# Patient Record
Sex: MALE | Race: WHITE
[De-identification: names, ages, dates, MRNs, and addresses within clinical notes are randomized per-mention and may not be internally consistent; named-entity substitution may affect disease eponyms.]

---

## 2018-05-27 ENCOUNTER — HOSPITAL ENCOUNTER (INPATIENT)
Dept: HOSPITAL 31 - C.ER | Age: 33
LOS: 3 days | Discharge: HOME | DRG: 544 | End: 2018-05-30
Attending: FAMILY MEDICINE | Admitting: FAMILY MEDICINE
Payer: COMMERCIAL

## 2018-05-27 VITALS — BODY MASS INDEX: 25.8 KG/M2

## 2018-05-27 DIAGNOSIS — N28.1: ICD-10-CM

## 2018-05-27 DIAGNOSIS — N25.81: ICD-10-CM

## 2018-05-27 DIAGNOSIS — I50.33: ICD-10-CM

## 2018-05-27 DIAGNOSIS — Z99.2: ICD-10-CM

## 2018-05-27 DIAGNOSIS — I13.2: Primary | ICD-10-CM

## 2018-05-27 DIAGNOSIS — D64.9: ICD-10-CM

## 2018-05-27 DIAGNOSIS — I42.0: ICD-10-CM

## 2018-05-27 DIAGNOSIS — N17.9: ICD-10-CM

## 2018-05-27 DIAGNOSIS — N18.6: ICD-10-CM

## 2018-05-27 DIAGNOSIS — I27.20: ICD-10-CM

## 2018-05-28 VITALS — RESPIRATION RATE: 20 BRPM

## 2018-05-28 LAB
% IRON SATURATION: 22
ALBUMIN SERPL-MCNC: 3.9 G/DL
ALBUMIN SERPL-MCNC: 4.4 G/DL
ALBUMIN/GLOB SERPL: 1.4 {RATIO}
ALBUMIN/GLOB SERPL: 1.5 {RATIO}
ALT SERPL-CCNC: 40 U/L
ALT SERPL-CCNC: 46 U/L
APTT BLD: 31 SECONDS
AST SERPL-CCNC: 16 U/L
AST SERPL-CCNC: 26 U/L
BASOPHILS # BLD AUTO: 0 K/UL
BASOPHILS # BLD AUTO: 0 K/UL
BASOPHILS NFR BLD: 0.4 %
BASOPHILS NFR BLD: 0.5 %
BUN SERPL-MCNC: 60 MG/DL
BUN SERPL-MCNC: 62 MG/DL
CALCIUM SERPL-MCNC: 9.2 MG/DL
CALCIUM SERPL-MCNC: 9.7 MG/DL
EOSINOPHIL # BLD AUTO: 0.1 K/UL
EOSINOPHIL # BLD AUTO: 0.2 K/UL
EOSINOPHIL NFR BLD: 2.4 %
EOSINOPHIL NFR BLD: 2.7 %
ERYTHROCYTE [DISTWIDTH] IN BLOOD BY AUTOMATED COUNT: 14.7 %
ERYTHROCYTE [DISTWIDTH] IN BLOOD BY AUTOMATED COUNT: 14.8 %
FERRITIN SERPL-MCNC: 747 NG/ML
GFR NON-AFRICAN AMERICAN: 5
GFR NON-AFRICAN AMERICAN: 5
HDLC SERPL-MCNC: 30 MG/DL
HGB BLD-MCNC: 8 G/DL
HGB BLD-MCNC: 8.7 G/DL
INR PPP: 1
IRON SERPL-MCNC: 40 UG/DL
LDLC SERPL-MCNC: 67 MG/DL
LIPASE: 179 U/L
LYMPHOCYTES # BLD AUTO: 1.3 K/UL
LYMPHOCYTES # BLD AUTO: 1.5 K/UL
LYMPHOCYTES NFR BLD AUTO: 20.8 %
LYMPHOCYTES NFR BLD AUTO: 21 %
MCH RBC QN AUTO: 31.7 PG
MCH RBC QN AUTO: 32 PG
MCHC RBC AUTO-ENTMCNC: 34.2 G/DL
MCHC RBC AUTO-ENTMCNC: 34.4 G/DL
MCV RBC AUTO: 92.6 FL
MCV RBC AUTO: 92.9 FL
MONOCYTES # BLD: 0.5 K/UL
MONOCYTES # BLD: 0.6 K/UL
MONOCYTES NFR BLD: 7.9 %
MONOCYTES NFR BLD: 8.7 %
NEUTROPHILS # BLD: 4.9 K/UL
NEUTROPHILS NFR BLD AUTO: 67.3 %
NRBC BLD AUTO-RTO: 0.1 %
PLATELET # BLD: 140 K/UL
PLATELET # BLD: 151 K/UL
PMV BLD AUTO: 8.3 FL
PMV BLD AUTO: 8.6 FL
PROTHROMBIN TIME: 10.9 SECONDS
RBC # BLD AUTO: 2.54 MIL/UL
RBC # BLD AUTO: 2.72 MIL/UL
TIBC SERPL-MCNC: 184 UG/DL
TROPONIN I SERPL-MCNC: 0.04 NG/ML
WBC # BLD AUTO: 6.1 K/UL
WBC # BLD AUTO: 7.3 K/UL

## 2018-05-28 PROCEDURE — 5A1D70Z PERFORMANCE OF URINARY FILTRATION, INTERMITTENT, LESS THAN 6 HOURS PER DAY: ICD-10-PCS

## 2018-05-28 RX ADMIN — ERYTHROPOIETIN SCH UNIT: 10000 INJECTION, SOLUTION INTRAVENOUS; SUBCUTANEOUS at 16:30

## 2018-05-28 RX ADMIN — Medication SCH: at 10:00

## 2018-05-28 NOTE — CP.PCM.CON
History of Present Illness





- History of Present Illness


History of Present Illness: 





This is a 34 yo male, originally from [place], with past medical hx of CHF 

with reduced ejection fraction, cardiomyopathy, pulmonary hypertension, renal 

cysts, and ESRD, presentedwith chief complaint of SOB. He also reports non 

productive cough. He is chronically short of breath and it was worsening today.

  Wife could not say the reason. Denies fevers, chills, cp, palpitations. He 

has been on dialysis for 2 yrs. He gets dialysis through left av fistula. He 

does not miss sessions, anuric.  He is compliant with all medications. He was 

given IV lasix in ER to help with vasodilation. 








PMH: CHF, dilated cardiomyopathy, pulmonary hypertension, renal cysts, ESRD on 

dialysis MWF 





PSH: av fistula left upper ext, vein mapping





Allergies: NKDA





FH: father- living- no medical problems; mother- living- HTN





Home meds: sensipar, lisinopril?, cardizem?, could not say for sure his home 

medications; needs med rec.





Social hx: Denies smoking. Denies secondhand smoke. Denies drinking. Denies 

drug use. Born in [place]. Does not work. Fully mobile, able to do all ADLs. 





Review of Systems





- Review of Systems


All systems: reviewed and no additional remarkable complaints except (as per HPI

)





Past Patient History





- Infectious Disease


Hx of Infectious Diseases: None





- Tetanus Immunizations


Tetanus Immunization: Unknown





- Past Medical History & Family History


Past Medical History?: Yes





- Past Social History


Smoking Status: Never Smoked


Chewing Tobacco Use: No


Cigar Use: No


Alcohol: None


Drugs: Denies


Home Situation {Lives}: With Family


Domestic Violence: Negative





- CARDIAC


Hx Cardiac Disorders: No





- PULMONARY


Other/Comment: SOB A COUPLE DAYS AGO





- NEUROLOGICAL


Hx Neurological Disorder: No





- HEENT


Other/Comment: L EYE BLURRY





- RENAL


Hx Chronic Kidney Disease: No





- ENDOCRINE/METABOLIC


Hx Endocrine Disorders: No





- HEMATOLOGICAL/ONCOLOGICAL


Hx Blood Disorders: No





- INTEGUMENTARY


Hx Dermatological Problems: No





- MUSCULOSKELETAL/RHEUMATOLOGICAL


Hx Musculoskeletal Disorders: No


Hx Falls: No





- GASTROINTESTINAL


Hx Gastrointestinal Disorders: No





- PSYCHIATRIC


Hx Substance Use: No





- SURGICAL HISTORY


Hx Surgeries: No





- ANESTHESIA


Hx Anesthesia: No





Meds


Allergies/Adverse Reactions: 


 Allergies











Allergy/AdvReac Type Severity Reaction Status Date / Time


 


No Known Allergies Allergy   Verified 02/11/16 01:36














- Medications


Medications: 


 Current Medications





Aspirin (Aspirin Chewable)  81 mg PO DAILY [facility]


Calcitriol (Rocaltrol)  0.25 mcg PO DAILY [facility]


Cinacalcet (Sensipar)  30 mg PO DAILY [facility]


Epoetin Jonathan (Procrit)  10,000 unit IV MWF [facility]


Lisinopril (Zestril)  2.5 mg PO DAILY [facility]


Rosuvastatin Calcium (Crestor)  10 mg PO HS [facility]


Vitamin B Complex/Vit C/Folic Acid (Nephro-Gisselle)  1 tab PO DAILY [facility]











Physical Exam





- Constitutional


Appears: Non-toxic, No Acute Distress, Chronically Ill





- Head Exam


Head Exam: NORMAL INSPECTION, NORMOCEPHALIC





- Eye Exam


Eye Exam: Normal appearance, PERRL





- ENT Exam


ENT Exam: Mucous Membranes Moist, Normal Exam





- Neck Exam


Neck exam: Positive for: Full Rom, Normal Inspection





- Respiratory Exam


Respiratory Exam: Decreased Breath Sounds, Rales (b/l), NORMAL BREATHING PATTERN





- Cardiovascular Exam


Cardiovascular Exam: REGULAR RHYTHM, RRR


Additional comments: 





lue avf





- GI/Abdominal Exam


GI & Abdominal Exam: Distended, Normal Bowel Sounds, Soft





- Extremities Exam


Extremities exam: Positive for: normal inspection (lue avf), pedal edema





- Back Exam


Back exam: NORMAL INSPECTION





- Psychiatric Exam


Psychiatric exam: Normal Affect, Normal Mood





- Skin


Skin Exam: Dry, Intact





Results





- Vital Signs


Recent Vital Signs: 


 Last Vital Signs











Temp  98.3 F   05/28/18 08:00


 


Pulse  88   05/28/18 08:00


 


Resp  20   05/28/18 08:00


 


BP  160/98 H  05/28/18 08:00


 


Pulse Ox  94 L  05/28/18 08:00














- Labs


Result Diagrams: 


 05/28/18 05:00





 05/28/18 06:51


Labs: 


 Laboratory Results - last 24 hr











  05/28/18 05/28/18 05/28/18





  00:41 00:41 00:41


 


WBC  7.3  D  


 


RBC  2.72 L  


 


Hgb  8.7 L D  


 


Hct  25.3 L  


 


MCV  92.9  


 


MCH  32.0 H  


 


MCHC  34.4  


 


RDW  14.7 H  


 


Plt Count  151  


 


MPV  8.6  


 


Neut % (Auto)  67.3  


 


Lymph % (Auto)  20.8  


 


Mono % (Auto)  8.7  


 


Eos % (Auto)  2.7  


 


Baso % (Auto)  0.5  


 


Neut # (Auto)  4.9  


 


Lymph # (Auto)  1.5  


 


Mono # (Auto)  0.6  


 


Eos # (Auto)  0.2  


 


Baso # (Auto)  0.0  


 


PT   10.9 


 


INR   1.0 


 


APTT   31 


 


Sodium    146


 


Potassium    4.8


 


Chloride    103


 


Carbon Dioxide    25


 


Anion Gap    23 H


 


BUN    62 H


 


Creatinine    12.0 H*


 


Est GFR ( Amer)    6


 


Est GFR (Non-Af Amer)    5


 


Random Glucose    90


 


Hemoglobin A1c   


 


Calcium    9.7


 


Ferritin   


 


Total Bilirubin    0.6


 


AST    26


 


ALT    46


 


Alkaline Phosphatase    44


 


Troponin I    0.0390


 


NT-Pro-B Natriuret Pep    05961 H


 


Total Protein    7.3


 


Albumin    4.4


 


Globulin    2.9


 


Albumin/Globulin Ratio    1.5


 


Triglycerides   


 


Cholesterol   


 


LDL Cholesterol Direct   


 


HDL Cholesterol   


 


Lipase    179


 


Free T4   


 


TSH 3rd Generation   














  05/28/18 05/28/18 05/28/18





  04:02 04:36 05:00


 


WBC    6.1


 


RBC    2.54 L


 


Hgb    8.0 L


 


Hct    23.5 L


 


MCV    92.6


 


MCH    31.7 H


 


MCHC    34.2


 


RDW    14.8 H


 


Plt Count    140


 


MPV    8.3


 


Neut % (Auto)   


 


Lymph % (Auto)    21.0


 


Mono % (Auto)    7.9


 


Eos % (Auto)    2.4


 


Baso % (Auto)    0.4


 


Neut # (Auto)   


 


Lymph # (Auto)    1.3


 


Mono # (Auto)    0.5


 


Eos # (Auto)    0.1


 


Baso # (Auto)    0.0


 


PT   


 


INR   


 


APTT   


 


Sodium   


 


Potassium   


 


Chloride   


 


Carbon Dioxide   


 


Anion Gap   


 


BUN   


 


Creatinine   


 


Est GFR ( Amer)   


 


Est GFR (Non-Af Amer)   


 


Random Glucose   


 


Hemoglobin A1c   


 


Calcium   


 


Ferritin   747.0 


 


Total Bilirubin   


 


AST   


 


ALT   


 


Alkaline Phosphatase   


 


Troponin I   


 


NT-Pro-B Natriuret Pep   


 


Total Protein   


 


Albumin   


 


Globulin   


 


Albumin/Globulin Ratio   


 


Triglycerides   101  D 


 


Cholesterol   143 


 


LDL Cholesterol Direct   67 


 


HDL Cholesterol   30 


 


Lipase   


 


Free T4  1.11  


 


TSH 3rd Generation   0.63 














  05/28/18 05/28/18





  06:51 07:04


 


WBC  


 


RBC  


 


Hgb  


 


Hct  


 


MCV  


 


MCH  


 


MCHC  


 


RDW  


 


Plt Count  


 


MPV  


 


Neut % (Auto)  


 


Lymph % (Auto)  


 


Mono % (Auto)  


 


Eos % (Auto)  


 


Baso % (Auto)  


 


Neut # (Auto)  


 


Lymph # (Auto)  


 


Mono # (Auto)  


 


Eos # (Auto)  


 


Baso # (Auto)  


 


PT  


 


INR  


 


APTT  


 


Sodium  144 


 


Potassium  5.0 


 


Chloride  105 


 


Carbon Dioxide  23 


 


Anion Gap  21 H 


 


BUN  60 H 


 


Creatinine  12.8 H* 


 


Est GFR ( Amer)  5 


 


Est GFR (Non-Af Amer)  5 


 


Random Glucose  89 


 


Hemoglobin A1c   4.9


 


Calcium  9.2 


 


Ferritin  


 


Total Bilirubin  0.6 


 


AST  16 L D 


 


ALT  40 


 


Alkaline Phosphatase  39 


 


Troponin I  


 


NT-Pro-B Natriuret Pep  


 


Total Protein  6.6 


 


Albumin  3.9 


 


Globulin  2.7 


 


Albumin/Globulin Ratio  1.4 


 


Triglycerides  


 


Cholesterol  


 


LDL Cholesterol Direct  


 


HDL Cholesterol  


 


Lipase  


 


Free T4  


 


TSH 3rd Generation  














Assessment & Plan


(1) CHF (congestive heart failure)


Status: Acute   





(2) ESRD needing dialysis


Status: Acute   





(3) Respiratory distress


Status: Acute   





(4) ESRD (end stage renal disease)


Status: Acute   





(5) Hypertension


Status: Acute   





(6) Secondary hyperparathyroidism


Status: Acute   





(7) Severe anemia


Status: Acute   





(8) CHF (congestive heart failure)


Status: Suspected   





- Assessment and Plan (Free Text)


Assessment: 





cmp/ chf/fluid overload


htn


anemia


esrd


pulm htn





plan:


hd today, aggressive fluid removal


fluid rrestriction 1L/day advised


increase lisinopril dose to 5mg daily


sindi w/ hd

## 2018-05-28 NOTE — C.PDOC
History Of Present Illness


33 year old male presents to the ED c/o SOB associated with coughing. Patient 

reports he goes to dialysis on Monday, Wednesday, Friday. Patient speaking in 

complete sentences. Patient denies fever, chills, nausea, vomit, weakness, 

numbness. 


Time Seen by Provider: 05/28/18 00:47


Chief Complaint (Nursing): Shortness Of Breath


History Per: Patient


History/Exam Limitations: no limitations


Onset/Duration Of Symptoms: Hrs


Current Symptoms Are (Timing): Still Present


Initiating Event: Other


Quality: "Pain"


Exacerbating Factor(s): Coughing


Current Respiratory Medications: See Home Med List


Associated Symptoms: Other (cough)


Recent travel outside of the [place]: No


Additional History Per: Patient





Past Medical History


Reviewed: Historical Data, Nursing Documentation, Vital Signs


Vital Signs: 


 Last Vital Signs











Temp  98.7 F   05/28/18 00:00


 


Pulse  84   05/28/18 01:49


 


Resp  29 H  05/28/18 01:49


 


BP  169/103 H  05/28/18 01:49


 


Pulse Ox  100   05/28/18 03:08














- Medical History


PMH: End Stage Renal Disease


Surgical History: No Surg Hx





- [facility] Procedures








BYPASS LEFT RADIAL ARTERY TO LOWER ARM VEIN, OPEN APPROACH (02/11/16)


INSERTION OF INFUSION DEV INTO SUP VENA CAVA, PERC APPROACH (02/11/16)


MEASURE CARDIAC SAMPL & PRESSURE, BILATERAL, PERC (02/11/16)


PERFORMANCE OF URINARY FILTRATION, MULTIPLE (02/11/16)


PLAIN RADIOGRAPHY OF R & L HEART USING L OSM CONTRAST (02/11/16)








Family History: States: Unknown Family Hx





- Social History


Hx Alcohol Use: No


Hx Substance Use: No





- Immunization History


Hx Tetanus Toxoid Vaccination: No


Hx Influenza Vaccination: No


Hx Pneumococcal Vaccination: No





Review Of Systems


Constitutional: Negative for: Fever, Chills


Cardiovascular: Negative for: Chest Pain


Respiratory: Positive for: Cough, Shortness of Breath


Gastrointestinal: Negative for: Nausea, Vomiting


Skin: Negative for: Rash


Neurological: Negative for: Weakness, Numbness





Physical Exam





- Physical Exam


Appears: Non-toxic, No Acute Distress


Skin: Warm, Dry


Head: Normacephalic


Eye(s): bilateral: Normal Inspection


Oral Mucosa: Moist


Neck: Supple


Chest: Symmetrical


Cardiovascular: Rhythm Regular


Respiratory: Rales (diffuse), No Rhonchi, No Wheezing


Gastrointestinal/Abdominal: Soft, No Tenderness, No Guarding, No Rebound


Extremity: Normal ROM, No Tenderness, Capillary Refill (< 2 seconds), No 

Swelling, Other (left forearm hemodialysis graft with good thrill and bruit)


Extremity: Bilateral: Atraumatic


Pulses: Left Radial: Normal, Right Radial: Normal


Neurological/Psych: Oriented x3, Normal Speech


Gait: Steady





ED Course And Treatment





- Laboratory Results


Result Diagrams: 


 05/28/18 00:41





 05/28/18 00:41


ECG: Interpreted By Me, Viewed By Me


O2 Sat by Pulse Oximetry: 100


Pulse Ox Interpretation: Normal





- Radiology


CXR: Interpreted by Me, Viewed By Me


CXR Interpretation: Yes: Other (acute pulm edema).  No: Infiltrates, Fracture, 

Cardiomegaly


Progress Note: 3:10 am  spoke with dr dahl ( renal covering for dr atkins) 

- ok to dialyse in am





Critical Care Time





- Critical Care Note


Total Time (in mins): 30


Documented critical care: time excludes all time spent performing seperately 

billable procedures.





Disposition


Discussed With DrSonny: Alexei Dumont


Comment: accepted the pt onhis service and took over the care at 3:10 AM


Doctor Will See Patient In The: ED


Counseled Patient/Family Regarding: Studies Performed, Diagnosis





- Disposition


Disposition: HOSPITALIZED


Disposition Time: 00:47


Condition: FAIR


Forms:  [facility] (English)





- POA


Present On Arrival: None





- Clinical Impression


Clinical Impression: 


 Dyspnea, Respiratory distress, CHF (congestive heart failure), ESRD needing 

dialysis








- Scribe Statement


The provider has reviewed the documentation as recorded by the Scribe


Antony Castillo





All medical record entries made by the Scribe were at my direction and 

personally dictated by me. I have reviewed the chart and agree that the record 

accurately reflects my personal performance of the history, physical exam, 

medical decision making, and the department course for this patient. I have 

also personally directed, reviewed, and agree with the discharge instructions 

and disposition.





Decision To Admit





- Pt Status Changed To:


Hospital Disposition Of: Inpatient





- Admit Certification


Admit to Inpatient:: After my assessment, the patient will require 

hospitalization for at least two midnights.  This is because of the severity of 

symptoms shown, intensity of services needed, and/or the medical risk in this 

patient being treated as an outpatient.





- InPatient:


Physician Admission Certification: I certify that this patient requires 2 or 

more midnights of care for the following reason:: After my assessment, the 

patient will require hospitalization for at least two midnights.  This is 

because of the severity of symptoms shown, intensity of services needed, and/or 

the medical risk in this patient being treated as an outpatient.





- .


Bed Request Type: Telemetry


Admitting Physician: Alexei Dumont


Patient Diagnosis: 


 Dyspnea, Respiratory distress, CHF (congestive heart failure), ESRD needing 

dialysis

## 2018-05-28 NOTE — CP.PCM.HP
<Rik Myles - Last Filed: 05/28/18 05:01>





History of Present Illness





- History of Present Illness


History of Present Illness: 














This is a 32 yo male, originally from [place], with past medical hx of CHF 

with reduced ejection fraction, cardiomyopathy, pulmonary hypertension, renal 

cysts, and ESRD, presenting today to [facility] ER with chief complaint of SOB. Pt 

is accompanied by wife. Wife says he drove pt into ER. Pt has been having sob 

that started on 7 pm this past evening. pt was attempting to go to sleep and 

could not. He also reports non productive cough. He is chronically short of 

breath and it was worsening today. Pt is end stage renal. Wife could not say 

the reason. Denies fevers, chills, cp, palpitations. He has been on dialysis 

for 2 yrs. He gets dialysis through left av fistula. He does not miss sessions. 

He sees Dr. Regan. He cannot say how much fluid was taken off last visit. He 

does not produce urine. He is compliant with all medications. He was given IV 

lasix in ER to help with vasodilation. 








PMD: Lane/TING in [facility] clinic





Specialists: Jass- nephrology





Insurance: [employer] 100 percent [facility]





PMH: CHF, dilated cardiomyopathy, pulmonary hypertension, renal cysts, ESRD on 

dialysis MWF 





PSH: av fistula left upper ext, vein mapping





Allergies: NKDA





FH: father- living- no medical problems; mother- living- HTN





Home meds: sensipar, lisinopril?, cardizem?, could not say for sure his home 

medications; needs med rec.





Social hx: Denies smoking. Denies secondhand smoke. Denies drinking. Denies 

drug use. Born in [place]. Does not work. Fully mobile, able to do all ADLs. 








Recent ER visits/hospitalizations: pt has not been to ER in our system in over 

a year.





Present on Admission





- Present on Admission


Any Indicators Present on Admission: No


History of Uncontrolled Diabetes: No


Urinary Catheter: No


Decubitus Ulcer Present: No





Review of Systems





- Constitutional


Constitutional: absent: Chills, Fever





- EENT


Eyes: absent: Blurred Vision, Change in Vision


Nose/Mouth/Throat: absent: Sore Throat, Neck Pain





- Cardiovascular


Cardiovascular: Dyspnea.  absent: Chest Pain, Chest Pain at Rest





- Respiratory


Respiratory: Cough, Dyspnea, Dyspnea on Exertion.  absent: Hemoptysis





- Gastrointestinal


Gastrointestinal: absent: Abdominal Pain, Nausea, Vomiting





- Genitourinary


Genitourinary: absent: Change in Urinary Stream, Difficulty Urinating





- Musculoskeletal


Musculoskeletal: absent: Back Pain, Neck Pain, Numbness, Tingling





- Integumentary


Integumentary: absent: Bleeding Lesions, Changing Lesions





- Neurological


Neurological: absent: Numbness, Focal Weakness, Tingling, Weakness





- Psychiatric


Psychiatric: absent: Anxiety, Hallucinations, Visual Hallucinations





- Hematologic/Lymphatic


Hematologic: absent: Easy Bleeding, Easy Bruising





Past Patient History





- Infectious Disease


Hx of Infectious Diseases: None





- Tetanus Immunizations


Tetanus Immunization: Unknown





- Past Medical History & Family History


Past Medical History?: Yes


Pertinent Family History: 





HTN , heart disease in family 





- Past Social History


Smoking Status: Never Smoked


Chewing Tobacco Use: No


Cigar Use: No


Alcohol: None


Drugs: Denies


Home Situation {Lives}: With Family


Domestic Violence: Negative





- CARDIAC


Hx Cardiac Disorders: No





- PULMONARY


Other/Comment: SOB A COUPLE DAYS AGO





- NEUROLOGICAL


Hx Neurological Disorder: No





- HEENT


Other/Comment: L EYE BLURRY





- RENAL


Hx Chronic Kidney Disease: No





- ENDOCRINE/METABOLIC


Hx Endocrine Disorders: No





- HEMATOLOGICAL/ONCOLOGICAL


Hx Blood Disorders: No





- INTEGUMENTARY


Hx Dermatological Problems: No





- MUSCULOSKELETAL/RHEUMATOLOGICAL


Hx Musculoskeletal Disorders: No


Hx Falls: No





- GASTROINTESTINAL


Hx Gastrointestinal Disorders: No





- PSYCHIATRIC


Hx Substance Use: No





- SURGICAL HISTORY


Hx Surgeries: No





- ANESTHESIA


Hx Anesthesia: No





Meds


Allergies/Adverse Reactions: 


 Allergies











Allergy/AdvReac Type Severity Reaction Status Date / Time


 


No Known Allergies Allergy   Verified 02/11/16 01:36














Physical Exam





- Constitutional


Appears: No Acute Distress, Chronically Ill





- Head Exam


Head Exam: ATRAUMATIC, NORMAL INSPECTION





- Eye Exam


Eye Exam: EOMI





- ENT Exam


ENT Exam: Mucous Membranes Moist





- Neck Exam


Neck exam: Positive for: Normal Inspection





- Respiratory Exam


Respiratory Exam: Rales.  absent: Respiratory Distress, NORMAL BREATHING PATTERN





- Cardiovascular Exam


Cardiovascular Exam: +S1, +S2.  absent: Tachycardia





- GI/Abdominal Exam


GI & Abdominal Exam: Normal Bowel Sounds, Soft.  absent: Tenderness





- Extremities Exam


Extremities exam: Positive for: full ROM, normal inspection





- Back Exam


Back exam: NORMAL INSPECTION





- Neurological Exam


Neurological exam: Alert, CN II-XII Intact, Oriented x3





- Psychiatric Exam


Psychiatric exam: Normal Affect, Normal Mood





- Skin


Skin Exam: Dry, Intact, Normal Color, Warm





Results





- Vital Signs


Recent Vital Signs: 





 Last Vital Signs











Temp  98.7 F   05/28/18 00:00


 


Pulse  85   05/28/18 03:12


 


Resp  20   05/28/18 03:12


 


BP  158/98 H  05/28/18 03:12


 


Pulse Ox  100   05/28/18 03:21














- Labs


Result Diagrams: 


 05/28/18 00:41





 05/28/18 00:41


Labs: 





 Laboratory Results - last 24 hr











  05/28/18 05/28/18 05/28/18





  00:41 00:41 00:41


 


WBC  7.3  D  


 


RBC  2.72 L  


 


Hgb  8.7 L D  


 


Hct  25.3 L  


 


MCV  92.9  


 


MCH  32.0 H  


 


MCHC  34.4  


 


RDW  14.7 H  


 


Plt Count  151  


 


MPV  8.6  


 


Neut % (Auto)  67.3  


 


Lymph % (Auto)  20.8  


 


Mono % (Auto)  8.7  


 


Eos % (Auto)  2.7  


 


Baso % (Auto)  0.5  


 


Neut # (Auto)  4.9  


 


Lymph # (Auto)  1.5  


 


Mono # (Auto)  0.6  


 


Eos # (Auto)  0.2  


 


Baso # (Auto)  0.0  


 


PT   10.9 


 


INR   1.0 


 


APTT   31 


 


Sodium    146


 


Potassium    4.8


 


Chloride    103


 


Carbon Dioxide    25


 


Anion Gap    23 H


 


BUN    62 H


 


Creatinine    12.0 H*


 


Est GFR ( Amer)    6


 


Est GFR (Non-Af Amer)    5


 


Random Glucose    90


 


Calcium    9.7


 


Total Bilirubin    0.6


 


AST    26


 


ALT    46


 


Alkaline Phosphatase    44


 


Troponin I    0.0390


 


NT-Pro-B Natriuret Pep    16892 H


 


Total Protein    7.3


 


Albumin    4.4


 


Globulin    2.9


 


Albumin/Globulin Ratio    1.5


 


Lipase    179














Assessment & Plan





- Assessment and Plan (Free Text)


Assessment: 














This is a 32 yo male, originally from [place], with past medical hx of CHF and 

ESRD, presenting with











1. Shortness of breath





-ef 45-50 percent


-echo 2018 shows mildly impaired LV fx, moderate pulm hypertension


-cardio consult. recs appreciated.


-ekg does show some changes from previous


-troponin not indicated


-no chest pain currently


-bipap if needed


-CXR -shows pulmonary edema 


-asa 81 mg po daily


-crestor 10 mg po hs


-HGB a1C pending; previously 4.7


-TSH and free t4 pending


-strict I/O


-daily weight 


-pt condition: fair 








2. hx of ESRD





-nephrology consult. recs appreciated. Dr. Regan


-dialysis in am


-continue nephrovite


-continue calcitriol .25 mcg po daily


-continue sensipar


-bleeding precautions


-BNP over 77,000 likely secondary to renal failure 











3. anemia





-normocytic


-ldh, haptoglobin


-stool occult blood


-iron studies 











3. GI/DVT ppx








-scds


-protonix 40 mg po daily


-renal diet











discussed with Dr. Dumont





<Alexei Dumont - Last Filed: 05/28/18 06:25>





Results





- Vital Signs


Recent Vital Signs: 





 Last Vital Signs











Temp  98.7 F   05/28/18 00:00


 


Pulse  96 H  05/28/18 05:59


 


Resp  22   05/28/18 05:59


 


BP  156/103 H  05/28/18 05:59


 


Pulse Ox  95   05/28/18 05:59














- Labs


Result Diagrams: 


 05/28/18 05:00





 05/28/18 00:41


Labs: 





 Laboratory Results - last 24 hr











  05/28/18 05/28/18 05/28/18





  00:41 00:41 00:41


 


WBC  7.3  D  


 


RBC  2.72 L  


 


Hgb  8.7 L D  


 


Hct  25.3 L  


 


MCV  92.9  


 


MCH  32.0 H  


 


MCHC  34.4  


 


RDW  14.7 H  


 


Plt Count  151  


 


MPV  8.6  


 


Neut % (Auto)  67.3  


 


Lymph % (Auto)  20.8  


 


Mono % (Auto)  8.7  


 


Eos % (Auto)  2.7  


 


Baso % (Auto)  0.5  


 


Neut # (Auto)  4.9  


 


Lymph # (Auto)  1.5  


 


Mono # (Auto)  0.6  


 


Eos # (Auto)  0.2  


 


Baso # (Auto)  0.0  


 


PT   10.9 


 


INR   1.0 


 


APTT   31 


 


Sodium    146


 


Potassium    4.8


 


Chloride    103


 


Carbon Dioxide    25


 


Anion Gap    23 H


 


BUN    62 H


 


Creatinine    12.0 H*


 


Est GFR ( Amer)    6


 


Est GFR (Non-Af Amer)    5


 


Random Glucose    90


 


Calcium    9.7


 


Ferritin   


 


Total Bilirubin    0.6


 


AST    26


 


ALT    46


 


Alkaline Phosphatase    44


 


Troponin I    0.0390


 


NT-Pro-B Natriuret Pep    70156 H


 


Total Protein    7.3


 


Albumin    4.4


 


Globulin    2.9


 


Albumin/Globulin Ratio    1.5


 


Triglycerides   


 


Cholesterol   


 


LDL Cholesterol Direct   


 


HDL Cholesterol   


 


Lipase    179


 


TSH 3rd Generation   














  05/28/18 05/28/18





  04:36 05:00


 


WBC   6.1


 


RBC   2.54 L


 


Hgb   8.0 L


 


Hct   23.5 L


 


MCV   92.6


 


MCH   31.7 H


 


MCHC   34.2


 


RDW   14.8 H


 


Plt Count   140


 


MPV   8.3


 


Neut % (Auto)  


 


Lymph % (Auto)   21.0


 


Mono % (Auto)   7.9


 


Eos % (Auto)   2.4


 


Baso % (Auto)   0.4


 


Neut # (Auto)  


 


Lymph # (Auto)   1.3


 


Mono # (Auto)   0.5


 


Eos # (Auto)   0.1


 


Baso # (Auto)   0.0


 


PT  


 


INR  


 


APTT  


 


Sodium  


 


Potassium  


 


Chloride  


 


Carbon Dioxide  


 


Anion Gap  


 


BUN  


 


Creatinine  


 


Est GFR ( Amer)  


 


Est GFR (Non-Af Amer)  


 


Random Glucose  


 


Calcium  


 


Ferritin  747.0 


 


Total Bilirubin  


 


AST  


 


ALT  


 


Alkaline Phosphatase  


 


Troponin I  


 


NT-Pro-B Natriuret Pep  


 


Total Protein  


 


Albumin  


 


Globulin  


 


Albumin/Globulin Ratio  


 


Triglycerides  101  D 


 


Cholesterol  143 


 


LDL Cholesterol Direct  67 


 


HDL Cholesterol  30 


 


Lipase  


 


TSH 3rd Generation  0.63 














Assessment & Plan





- Date & Time


Date: 05/28/18 (I have seen and examined the patient.  I agree with the 

findings and plan of care as documented by Dr. Myles.  Patient with CHF 

exacerbation.  Lasix IV given in ED.  Consult to Cardio.  BIPAP as necessary.  

Also with ESRD.  Consult to Nephro.  Dialysis this AM.  Monitor for acute 

changes.)


Time: 06:24





Attending/Attestation





- Attestation


I have personally seen and examined this patient.: Yes


I have fully participated in the care of the patient.: Yes


I have reviewed all pertinent clinical information: Yes

## 2018-05-28 NOTE — RAD
HISTORY:

sob  



COMPARISON:

Portable chest 02/12/2016. 



FINDINGS:



LUNGS:

Patchy airspace disease in the bilateral bases underlying CHF is not 

excluded. Interstitial my markings are increased at the mid to 

inferior lung zones bilaterally.



PLEURA:

No significant pleural effusion identified, no pneumothorax apparent.



CARDIOVASCULAR:

Cardiomegaly is reiterated with right center venous dialysis catheter 

removed. Cephalization is prominent indicating CHF.



OSSEOUS STRUCTURES:

No significant abnormalities.



VISUALIZED UPPER ABDOMEN:

Normal.



OTHER FINDINGS:

None.



IMPRESSION:

Prominent CHF. Underlying bilateral basilar airspace disease not 

excluded. Clinically correlate further.

## 2018-05-28 NOTE — CP.PCM.CON
History of Present Illness





- History of Present Illness


History of Present Illness: 





33 year old with hx of dilated cardiomyopathy, 2016 had cath with normal 

coronaries and low EF 20%, with PAH. also with acute renal failure. started HD 

and medical treatment, improved EF to 60% on Mugga, no ICD, follow at the 

clinic at , 5/18 echo with EF 45-50%. still with severe pulmonary HTN, on HD. 

now with chronic SOB. ? not on B Blocker, needs to stop digoxin, start 

metoprolol, continue lisinopril and HD, no need for ICD, + fluid restriction, 

strict observation of daily wt, minimal EKG changes, dig effect v/s 

cardiomyopathy.





Review of Systems





- Review of Systems


Systems not reviewed;Unavailable: Respiratory Distress





- Constitutional


Constitutional: Anorexia, Weakness





- EENT


Eyes: absent: Discharge


Ears: absent: Ear Discharge, Dizziness


Nose/Mouth/Throat: absent: Epistaxis, Bleeding Gums





- Cardiovascular


Cardiovascular: absent: Acrocyanosis, Chest Pain, Diaphoresis, Leg Edema, 

Palpitations, Syncope





- Respiratory


Respiratory: Cough, Dyspnea.  absent: Hemoptysis





- Gastrointestinal


Gastrointestinal: absent: Abdominal Pain, Diarrhea, Hematochezia, Vomiting





- Genitourinary


Genitourinary: absent: Change in Urinary Stream





Past Patient History





- Infectious Disease


Hx of Infectious Diseases: None





- Tetanus Immunizations


Tetanus Immunization: Unknown





- Past Medical History & Family History


Past Medical History?: Yes





- Past Social History


Smoking Status: Never Smoked


Chewing Tobacco Use: No


Cigar Use: No


Alcohol: None


Drugs: Denies


Home Situation {Lives}: With Family


Domestic Violence: Negative





- CARDIAC


Hx Cardiac Disorders: No





- PULMONARY


Other/Comment: SOB A COUPLE DAYS AGO





- NEUROLOGICAL


Hx Neurological Disorder: No





- HEENT


Other/Comment: L EYE BLURRY





- RENAL


Hx Chronic Kidney Disease: No





- ENDOCRINE/METABOLIC


Hx Endocrine Disorders: No





- HEMATOLOGICAL/ONCOLOGICAL


Hx Blood Disorders: No





- INTEGUMENTARY


Hx Dermatological Problems: No





- MUSCULOSKELETAL/RHEUMATOLOGICAL


Hx Musculoskeletal Disorders: No


Hx Falls: No





- GASTROINTESTINAL


Hx Gastrointestinal Disorders: No





- PSYCHIATRIC


Hx Substance Use: No





- SURGICAL HISTORY


Hx Surgeries: No





- ANESTHESIA


Hx Anesthesia: No





Meds


Allergies/Adverse Reactions: 


 Allergies











Allergy/AdvReac Type Severity Reaction Status Date / Time


 


No Known Allergies Allergy   Verified 02/11/16 01:36














- Medications


Medications: 


 Current Medications





Aspirin (Aspirin Chewable)  81 mg PO DAILY DEON


Calcitriol (Rocaltrol)  0.25 mcg PO DAILY Pending sale to [facility]


Cinacalcet (Sensipar)  30 mg PO DAILY Pending sale to [facility]


Digoxin (Digoxin)  0.125 mg PO DAILY@1800 DEON


Epoetin Jonathan (Procrit)  10,000 unit IV MWF Pending sale to [facility]


Lisinopril (Zestril)  2.5 mg PO DAILY Pending sale to [facility]


Rosuvastatin Calcium (Crestor)  10 mg PO HS Pending sale to [facility]


Vitamin B Complex/Vit C/Folic Acid (Nephro-Gisselle)  1 tab PO DAILY DEON











Physical Exam





- Constitutional


Appears: Non-toxic





- Head Exam


Head Exam: ATRAUMATIC





- Eye Exam


Eye Exam: EOMI





- ENT Exam


ENT Exam: Mucous Membranes Moist





- Neck Exam


Neck exam: Negative for: Lymphadenopathy, Thyromegaly





- Respiratory Exam


Respiratory Exam: Clear to Auscultation Bilateral.  absent: Rales





- Cardiovascular Exam


Cardiovascular Exam: REGULAR RHYTHM, Systolic Murmur





- GI/Abdominal Exam


GI & Abdominal Exam: Normal Bowel Sounds.  absent: Organomegaly





- Rectal Exam


Rectal Exam: Deferred





- Extremities Exam


Extremities exam: Positive for: normal capillary refill.  Negative for: calf 

tenderness





- Neurological Exam


Neurological exam: Alert, Oriented x3





- Psychiatric Exam


Psychiatric exam: Normal Mood





- Skin


Skin Exam: Dry





Results





- Vital Signs


Recent Vital Signs: 


 Last Vital Signs











Temp  98.3 F   05/28/18 08:00


 


Pulse  88   05/28/18 08:00


 


Resp  20   05/28/18 08:00


 


BP  160/98 H  05/28/18 08:00


 


Pulse Ox  94 L  05/28/18 08:00














- Labs


Result Diagrams: 


 05/29/18 07:46





 05/29/18 07:46


Labs: 


 Laboratory Results - last 24 hr











  05/28/18 05/28/18 05/28/18





  00:41 00:41 00:41


 


WBC  7.3  D  


 


RBC  2.72 L  


 


Hgb  8.7 L D  


 


Hct  25.3 L  


 


MCV  92.9  


 


MCH  32.0 H  


 


MCHC  34.4  


 


RDW  14.7 H  


 


Plt Count  151  


 


MPV  8.6  


 


Neut % (Auto)  67.3  


 


Lymph % (Auto)  20.8  


 


Mono % (Auto)  8.7  


 


Eos % (Auto)  2.7  


 


Baso % (Auto)  0.5  


 


Neut # (Auto)  4.9  


 


Lymph # (Auto)  1.5  


 


Mono # (Auto)  0.6  


 


Eos # (Auto)  0.2  


 


Baso # (Auto)  0.0  


 


PT   10.9 


 


INR   1.0 


 


APTT   31 


 


Sodium    146


 


Potassium    4.8


 


Chloride    103


 


Carbon Dioxide    25


 


Anion Gap    23 H


 


BUN    62 H


 


Creatinine    12.0 H*


 


Est GFR ( Amer)    6


 


Est GFR (Non-Af Amer)    5


 


Random Glucose    90


 


Hemoglobin A1c   


 


Calcium    9.7


 


Ferritin   


 


Total Bilirubin    0.6


 


AST    26


 


ALT    46


 


Alkaline Phosphatase    44


 


Troponin I    0.0390


 


NT-Pro-B Natriuret Pep    46531 H


 


Total Protein    7.3


 


Albumin    4.4


 


Globulin    2.9


 


Albumin/Globulin Ratio    1.5


 


Triglycerides   


 


Cholesterol   


 


LDL Cholesterol Direct   


 


HDL Cholesterol   


 


Lipase    179


 


Free T4   


 


TSH 3rd Generation   














  05/28/18 05/28/18 05/28/18





  04:02 04:36 05:00


 


WBC    6.1


 


RBC    2.54 L


 


Hgb    8.0 L


 


Hct    23.5 L


 


MCV    92.6


 


MCH    31.7 H


 


MCHC    34.2


 


RDW    14.8 H


 


Plt Count    140


 


MPV    8.3


 


Neut % (Auto)   


 


Lymph % (Auto)    21.0


 


Mono % (Auto)    7.9


 


Eos % (Auto)    2.4


 


Baso % (Auto)    0.4


 


Neut # (Auto)   


 


Lymph # (Auto)    1.3


 


Mono # (Auto)    0.5


 


Eos # (Auto)    0.1


 


Baso # (Auto)    0.0


 


PT   


 


INR   


 


APTT   


 


Sodium   


 


Potassium   


 


Chloride   


 


Carbon Dioxide   


 


Anion Gap   


 


BUN   


 


Creatinine   


 


Est GFR ( Amer)   


 


Est GFR (Non-Af Amer)   


 


Random Glucose   


 


Hemoglobin A1c   


 


Calcium   


 


Ferritin   747.0 


 


Total Bilirubin   


 


AST   


 


ALT   


 


Alkaline Phosphatase   


 


Troponin I   


 


NT-Pro-B Natriuret Pep   


 


Total Protein   


 


Albumin   


 


Globulin   


 


Albumin/Globulin Ratio   


 


Triglycerides   101  D 


 


Cholesterol   143 


 


LDL Cholesterol Direct   67 


 


HDL Cholesterol   30 


 


Lipase   


 


Free T4  1.11  


 


TSH 3rd Generation   0.63 














  05/28/18 05/28/18





  06:51 07:04


 


WBC  


 


RBC  


 


Hgb  


 


Hct  


 


MCV  


 


MCH  


 


MCHC  


 


RDW  


 


Plt Count  


 


MPV  


 


Neut % (Auto)  


 


Lymph % (Auto)  


 


Mono % (Auto)  


 


Eos % (Auto)  


 


Baso % (Auto)  


 


Neut # (Auto)  


 


Lymph # (Auto)  


 


Mono # (Auto)  


 


Eos # (Auto)  


 


Baso # (Auto)  


 


PT  


 


INR  


 


APTT  


 


Sodium  144 


 


Potassium  5.0 


 


Chloride  105 


 


Carbon Dioxide  23 


 


Anion Gap  21 H 


 


BUN  60 H 


 


Creatinine  12.8 H* 


 


Est GFR ( Amer)  5 


 


Est GFR (Non-Af Amer)  5 


 


Random Glucose  89 


 


Hemoglobin A1c   4.9


 


Calcium  9.2 


 


Ferritin  


 


Total Bilirubin  0.6 


 


AST  16 L D 


 


ALT  40 


 


Alkaline Phosphatase  39 


 


Troponin I  


 


NT-Pro-B Natriuret Pep  


 


Total Protein  6.6 


 


Albumin  3.9 


 


Globulin  2.7 


 


Albumin/Globulin Ratio  1.4 


 


Triglycerides  


 


Cholesterol  


 


LDL Cholesterol Direct  


 


HDL Cholesterol  


 


Lipase  


 


Free T4  


 


TSH 3rd Generation  














Assessment & Plan


(1) CHF (congestive heart failure)


Status: Acute   


Comment: acute over chronic LV diastolic failurre, fluid overload

## 2018-05-28 NOTE — CP.PCM.PN
<DevSuzanne SSonny - Last Filed: 05/28/18 17:14>





Subjective





- Date & Time of Evaluation


Date of Evaluation: 05/28/18


Time of Evaluation: 07:00





- Subjective


Subjective: 





Medicine Progress Note:





Patient was seen and examined at bedside in the AM.  Patient stated he felt 

short of breath.  Per my attending Dr. Mart was told by patient that he ate an 

abundance of fried chicken on Friday evening.  The patient is scheduled for 

dialysis this morning.  





Objective





- Vital Signs/Intake and Output


Vital Signs (last 24 hours): 


 











Temp Pulse Resp BP Pulse Ox


 


 98.9 F   99 H  21   154/105 H  98 


 


 05/28/18 07:16  05/28/18 07:16  05/28/18 07:16  05/28/18 07:16  05/28/18 07:16











- Medications


Medications: 


 Current Medications





Aspirin (Aspirin Chewable)  81 mg PO DAILY [facility]


Calcitriol (Rocaltrol)  0.25 mcg PO DAILY [facility]


Cinacalcet (Sensipar)  30 mg PO DAILY [facility]


Digoxin (Digoxin)  0.125 mg PO DAILY@1800 [facility]


Epoetin Jonathan (Procrit)  10,000 unit IV MWF [facility]


Lisinopril (Zestril)  2.5 mg PO DAILY [facility]


Rosuvastatin Calcium (Crestor)  10 mg PO HS [facility]


Vitamin B Complex/Vit C/Folic Acid (Nephro-Gisselle)  1 tab PO DAILY [facility]











- Labs


Labs: 


 





 05/28/18 05:00 





 05/28/18 00:41 





 











PT  10.9 SECONDS (9.7-12.2)   05/28/18  00:41    


 


INR  1.0   05/28/18  00:41    


 


APTT  31 SECONDS (21-34)   05/28/18  00:41    














- Constitutional


Appears: Chronically Ill





- Head Exam


Head Exam: ATRAUMATIC, NORMAL INSPECTION





- Eye Exam


Eye Exam: EOMI, Normal appearance





- ENT Exam


ENT Exam: Mucous Membranes Moist





- Respiratory Exam


Respiratory Exam: Rales (bilateral lower lobes), NORMAL BREATHING PATTERN





- Cardiovascular Exam


Cardiovascular Exam: REGULAR RHYTHM, JVD, +S1, +S2





- GI/Abdominal Exam


GI & Abdominal Exam: Soft, Normal Bowel Sounds.  absent: Tenderness





- Extremities Exam


Extremities Exam: absent: Pedal Edema, Tenderness


Additional comments: 





AV fistula on the left arm





- Neurological Exam


Neurological Exam: Alert, Awake, Oriented x3





- Psychiatric Exam


Psychiatric exam: Flat Affect





- Skin


Skin Exam: Normal Color





Assessment and Plan





- Assessment and Plan (Free Text)


Assessment: 





Shortness of breath


Secondary to ESRD vs. Dilated Cardiomypathy


- Bipap prn


- Chest Xray: pulmonary edema 


- Dialysis MWF


- Strict I/O


- Daily weight 





History of ESRD


Nephrology consult: Dr. Regan --> help appreciated 


- Dialysis MWF


- Medications


* continue nephrovite


* continue calcitriol .25 mcg po daily


* continue sensipar


* Procrit 10,000 units IV MWF


- proBNP over 77,000 likely secondary to renal failure 





History of Dilated Cardiomyopathy 


- proBNP over 77,000 likely secondary to renal failure 


- EKG: NSR at 83bpm; QT prolongation 


- ECHO (5/2/18): The left ventricle is moderately dilated.  There is moderate 

concentric left ventricular hypertrophy.  The systolic function is mildly 

impaired.  The EF 45-50%. (please see full report for details)





History of Pulmonary HTN


- Medications


* Aspirin 81 mg po daily


* Crestor 10 mg po HS


* Lisinopril 5mg po daily


* Coreg 3.125mg q12h - held until breathing improves 


- hA1c 4.9


- TSH 0.63 and free T4 1.11


- Lipid Profile: Triglycerides 101; Total Cholesterol 143; LDL 67; HDL 30





History of Renal Cysts





History of anemia


secondary to ESRD 


- Normocytic


- f/u Iron studies 


- f/u stool occult blood





Prophylaxis 


- SCD


- GI prophylaxis not indicated 


- Renal dialysis diet





Case Discussed with Dr. Barron Méndez PGY-1 





<Augusto Mart - Last Filed: 05/28/18 20:27>





Objective





- Vital Signs/Intake and Output


Vital Signs (last 24 hours): 


 











Temp Pulse Resp BP Pulse Ox


 


 98.3 F   67   20   155/91 H  95 


 


 05/28/18 17:59  05/28/18 19:25  05/28/18 17:59  05/28/18 17:59  05/28/18 17:59











- Medications


Medications: 


 Current Medications





Aspirin (Aspirin Chewable)  [facility]ILY [facility]


   Last Admin: 05/28/18 10:00 Dose:  Not Given


Calcitriol (Rocaltrol)  0.25 mcg PO DAILY [facility]


   Last Admin: 05/28/18 10:00 Dose:  Not Given


Cinacalcet (Sensipar)  30 mg PO DAILY [facility]


   Last Admin: 05/28/18 10:00 Dose:  Not Given


Epoetin Jonathan (Procrit)  10,000 unit IV [facility]WF [facility]


   Last Admin: 05/28/18 16:30 Dose:  10,000 unit


Lisinopril (Zestril)  5 mg PO DAILY [facility]


Rosuvastatin Calcium (Crestor)  10 mg PO HS [facility]


Vitamin B Complex/Vit C/Folic Acid (Nephro-Gisselle)  1 tab PO DAILY [facility]


   Last Admin: 05/28/18 10:00 Dose:  Not Given











- Labs


Labs: 


 





 05/28/18 05:00 





 05/28/18 06:51 





 











PT  10.9 SECONDS (9.7-12.2)   05/28/18  00:41    


 


INR  1.0   05/28/18  00:41    


 


APTT  31 SECONDS (21-34)   05/28/18  00:41    














Attending/Attestation





- Attestation


I have personally seen and examined this patient.: Yes


I have fully participated in the care of the patient.: Yes


I have reviewed all pertinent clinical information, including history, physical 

exam and plan: Yes


Notes (Text): 





05/28/18 20:23


Patient was seen and examined at 8:00 AM with wife Conchis present





Exam, assessment and plan were gone over with resident Dr. Méndez





Also on ROS:


SOB better with O2 via NC


Dry nonproductive cough


Does not produce urine for some time now





Also on Exam:


JVD


Hepatojugular Reflux


Holosystolic Murmur in all auscultatory fields


Left Arm AVF + Thrill


Right > Left mid to lower lung field inspiratory crackles





Also on Assessment and Plan:





Prolonged QTc: EKG unchanged when compared to 2/11/16





Heart Failure exacerbation likely the cause of SOB: patient had fast food on 

Friday night as per wife (fried chicken) and symptoms began on Saturday 





For HD today





Augusto Mart D.O.

## 2018-05-29 LAB
ALBUMIN SERPL-MCNC: 4 G/DL
ALBUMIN/GLOB SERPL: 1.4 {RATIO}
ALT SERPL-CCNC: 26 U/L
AST SERPL-CCNC: 15 U/L
BASOPHILS # BLD AUTO: 0 K/UL
BASOPHILS NFR BLD: 0.5 %
BUN SERPL-MCNC: 36 MG/DL
CALCIUM SERPL-MCNC: 8.5 MG/DL
EOSINOPHIL # BLD AUTO: 0.1 K/UL
EOSINOPHIL NFR BLD: 2.5 %
ERYTHROCYTE [DISTWIDTH] IN BLOOD BY AUTOMATED COUNT: 14.7 %
GFR NON-AFRICAN AMERICAN: 6
HGB BLD-MCNC: 9.6 G/DL
LYMPHOCYTES # BLD AUTO: 1.2 K/UL
LYMPHOCYTES NFR BLD AUTO: 21.1 %
MCH RBC QN AUTO: 32.4 PG
MCHC RBC AUTO-ENTMCNC: 35.3 G/DL
MCV RBC AUTO: 91.8 FL
MONOCYTES # BLD: 0.6 K/UL
MONOCYTES NFR BLD: 10.1 %
NEUTROPHILS # BLD: 3.6 K/UL
NEUTROPHILS NFR BLD AUTO: 65.8 %
NRBC BLD AUTO-RTO: 0 %
PLATELET # BLD: 142 K/UL
PMV BLD AUTO: 8.4 FL
RBC # BLD AUTO: 2.97 MIL/UL
WBC # BLD AUTO: 5.5 K/UL

## 2018-05-29 RX ADMIN — Medication SCH TAB: at 09:10

## 2018-05-29 NOTE — CP.PCM.PN
<Cely Payton - Last Filed: 05/29/18 16:17>





Subjective





- Date & Time of Evaluation


Date of Evaluation: 05/29/18


Time of Evaluation: 11:41





- Subjective


Subjective: 


Medicine progress note for Dr. ТАТЬЯНА Mart





Patient was seen and examined at bedside in no acute distress. Patient reports 

his breathing has significantly improved; he no longer feels short of breath. 

Patient has no complaints today. He had a normal BM two days ago, but denies 

constipation. Patient denies chest pain, abdominal pain, palpitations, nausea, 

vomiting, fevers, headaches, dysuria, leg pain/swelling.





Objective





- Vital Signs/Intake and Output


Vital Signs (last 24 hours): 


 











Temp Pulse Resp BP Pulse Ox


 


 98.2 F   75   20   155/94 H  96 


 


 05/29/18 07:00  05/29/18 07:00  05/29/18 07:00  05/29/18 07:00  05/29/18 07:00








Intake and Output: 


 











 05/29/18 05/29/18





 06:59 18:59


 


Intake Total 1000 


 


Balance 1000 














- Medications


Medications: 


 Current Medications





Aspirin (Aspirin Chewable)  81 mg PO DAILY [facility]


   Last Admin: 05/29/18 09:10 Dose:  81 mg


Calcitriol (Rocaltrol)  0.25 mcg PO DAILY [facility]


   Last Admin: 05/29/18 09:10 Dose:  0.25 mcg


Carvedilol (Coreg)  3.125 mg PO BID [facility]


   Last Admin: 05/29/18 09:10 Dose:  3.125 mg


Cinacalcet (Sensipar)  30 mg PO DAILY [facility]


   Last Admin: 05/28/18 10:00 Dose:  Not Given


Epoetin Jonathan (Procrit)  10,000 unit IV MWF [facility]


   Last Admin: 05/28/18 16:30 Dose:  10,000 unit


Heparin Sodium (Porcine) (Heparin)  5,000 units SC Q12 [facility]


   Last Admin: 05/29/18 09:10 Dose:  5,000 units


Lisinopril (Zestril)  5 mg PO DAILY [facility]


   Last Admin: 05/29/18 09:10 Dose:  5 mg


Rosuvastatin Calcium (Crestor)  10 mg PO HS [facility]


   Last Admin: 05/28/18 21:32 Dose:  10 mg


Vitamin B Complex/Vit C/Folic Acid (Nephro-Gisselle)  1 tab PO DAILY [facility]


   Last Admin: 05/29/18 09:10 Dose:  1 tab











- Labs


Labs: 


 





 05/29/18 07:46 





 05/29/18 07:46 





 











PT  10.9 SECONDS (9.7-12.2)   05/28/18  00:41    


 


INR  1.0   05/28/18  00:41    


 


APTT  31 SECONDS (21-34)   05/28/18  00:41    














- Constitutional


Appears: No Acute Distress





- Head Exam


Head Exam: ATRAUMATIC, NORMAL INSPECTION





- Eye Exam


Eye Exam: EOMI, Normal appearance





- ENT Exam


ENT Exam: Mucous Membranes Moist





- Respiratory Exam


Respiratory Exam: NORMAL BREATHING PATTERN.  absent: Rales, Rhonchi, Wheezes, 

Respiratory Distress





- Cardiovascular Exam


Cardiovascular Exam: +S1, +S2, Murmur





- GI/Abdominal Exam


GI & Abdominal Exam: Soft, Normal Bowel Sounds.  absent: Distended, Firm, 

Tenderness





- Extremities Exam


Extremities Exam: Normal Inspection.  absent: Pedal Edema, Tenderness


Additional comments: 





Left Forearm- AV fistula, + thrill





- Neurological Exam


Neurological Exam: Alert, Awake, Oriented x3





- Psychiatric Exam


Psychiatric exam: Normal Affect, Normal Mood





- Skin


Skin Exam: Dry, Intact, Normal Color, Warm





Assessment and Plan





- Assessment and Plan (Free Text)


Plan: 


Shortness of breath


[place] Secondary to Dilated Cardiomyopathy


- Bipap prn


- Chest Xray: pulmonary edema 


- Dialysis MWF


- Strict I/O


- Daily weight (improving, 147 on 5/29 from 154 on 5/28)





History of ESRD


Nephrology consult: Dr. Regan --> help appreciated 


- Dialysis MWF


- Medications


* continue nephrovite


* continue calcitriol .25 mcg po daily


* continue sensipar


* Procrit 10,000 units IV MWF


- proBNP over 77,000 likely secondary to renal failure 





History of Dilated Cardiomyopathy 


- proBNP over 77,000 likely secondary to renal failure 


- EKG: NSR at 83bpm; QT prolongation 


- ECHO (5/2/18): The left ventricle is moderately dilated; moderate concentric 

left ventricular hypertrophy; systolic function is mildly impaired;EF 45-50%. (

please see full report for details)





History of Pulmonary HTN


- Medications


* Aspirin 81 mg po daily


* Crestor 10 mg po HS


* Lisinopril 5mg po daily


* Coreg 3.125mg q12h - held until breathing improves 


- A1c 4.9


- TSH 0.63 and free T4 1.11


- Lipid Profile: Triglycerides 101; Total Cholesterol 143; LDL 67; HDL 30





History of Renal Cysts





History of anemia


secondary to ESRD 


- Normocytic


- Iron studies: iron 40, TIBC 184, %sat 22, Ferritin 747,


- f/u stool occult blood





Prolonged QTc


- EKG unchanged when compared to 2/11/16





Prophylaxis 


- SCD


- GI prophylaxis not indicated 


- Renal dialysis diet





<Augusto Mart - Last Filed: 05/29/18 19:19>





Objective





- Vital Signs/Intake and Output


Vital Signs (last 24 hours): 


 











Temp Pulse Resp BP Pulse Ox


 


 99.1 F   76   20   145/90   98 


 


 05/29/18 15:40  05/29/18 17:54  05/29/18 15:40  05/29/18 15:40  05/29/18 15:40








Intake and Output: 


 











 05/29/18 05/30/18





 18:59 06:59


 


Intake Total 480 


 


Balance 480 














- Medications


Medications: 


 Current Medications





Aspirin (Aspirin Chewable)  81 mg PO DAILY [facility]


   Last Admin: 05/29/18 09:10 Dose:  81 mg


Calcitriol (Rocaltrol)  0.25 mcg PO DAILY [facility]


   Last Admin: 05/29/18 09:10 Dose:  0.25 mcg


Carvedilol (Coreg)  3.125 mg PO BID [facility]


   Last Admin: 05/29/18 17:08 Dose:  3.125 mg


Cinacalcet (Sensipar)  30 mg PO DAILY [facility]


   Last Admin: 05/29/18 12:20 Dose:  30 mg


Epoetin Jonathan (Procrit)  10,000 unit IV MWF [facility]


   Last Admin: 05/28/18 16:30 Dose:  10,000 unit


Heparin Sodium (Porcine) (Heparin)  5,000 units SC Q12 [facility]


   Last Admin: 05/29/18 09:10 Dose:  5,000 units


Lisinopril (Zestril)  5 mg PO DAILY [facility]


   Last Admin: 05/29/18 09:10 Dose:  5 mg


Rosuvastatin Calcium (Crestor)  10 mg PO HS [facility]


   Last Admin: 05/28/18 21:32 Dose:  10 mg


Vitamin B Complex/Vit C/Folic Acid (Nephro-Gisselle)  1 tab PO DAILY [facility]


   Last Admin: 05/29/18 09:10 Dose:  1 tab











- Labs


Labs: 


 





 05/29/18 07:46 





 05/29/18 07:46 





 











PT  10.9 SECONDS (9.7-12.2)   05/28/18  00:41    


 


INR  1.0   05/28/18  00:41    


 


APTT  31 SECONDS (21-34)   05/28/18  00:41    














Attending/Attestation





- Attestation


I have personally seen and examined this patient.: Yes


I have fully participated in the care of the patient.: Yes


I have reviewed all pertinent clinical information, including history, physical 

exam and plan: Yes


Notes (Text): 





05/29/18 19:11


Patient was seen and examined at 7:45 AM





Exam, assessment and plan were gone over with the resident Dr. Goodson.





Also on ROS:


Breathing is much better





Also on Exam:


Respiratory: CTA B/L


NO JVD


NO Hepatojugular refulx


NO edema of extremities


Holosystolic murmur in all auscultatory heart fields





I measured his weight and it is now 147 pounds compared to 154 from 5/28/18.


 


Plan would be discharge after dialysis 5/30/18.





Augusto Mart D.O.

## 2018-05-29 NOTE — CP.PCM.PN
Subjective





- Date & Time of Evaluation


Date of Evaluation: 05/29/18


Time of Evaluation: 12:00





- Subjective


Subjective: 





Improved clinically after hemodialysis, avoid digoxin and add beta blocker





Objective





- Vital Signs/Intake and Output


Vital Signs (last 24 hours): 


 











Temp Pulse Resp BP Pulse Ox


 


 98.2 F   75   20   155/94 H  96 


 


 05/29/18 07:00  05/29/18 07:00  05/29/18 07:00  05/29/18 07:00  05/29/18 07:00








Intake and Output: 


 











 05/29/18 05/29/18





 06:59 18:59


 


Intake Total 1000 


 


Balance 1000 














- Medications


Medications: 


 Current Medications





Aspirin (Aspirin Chewable)  81 mg PO DAILY [facility]


   Last Admin: 05/29/18 09:10 Dose:  81 mg


Calcitriol (Rocaltrol)  0.25 mcg PO DAILY [facility]


   Last Admin: 05/29/18 09:10 Dose:  0.25 mcg


Carvedilol (Coreg)  3.125 mg PO BID [facility]


   Last Admin: 05/29/18 09:10 Dose:  3.125 mg


Cinacalcet (Sensipar)  30 mg PO DAILY [facility]


   Last Admin: 05/28/18 10:00 Dose:  Not Given


Epoetin Jonathan (Procrit)  10,000 unit IV MWF [facility]


   Last Admin: 05/28/18 16:30 Dose:  10,000 unit


Heparin Sodium (Porcine) (Heparin)  5,000 units SC Q12 [facility]


   Last Admin: 05/29/18 09:10 Dose:  5,000 units


Lisinopril (Zestril)  5 mg PO DAILY [facility]


   Last Admin: 05/29/18 09:10 Dose:  5 mg


Rosuvastatin Calcium (Crestor)  10 mg PO HS [facility]


   Last Admin: 05/28/18 21:32 Dose:  10 mg


Vitamin B Complex/Vit C/Folic Acid (Nephro-Gisselle)  1 tab PO DAILY [facility]


   Last Admin: 05/29/18 09:10 Dose:  1 tab











- Labs


Labs: 


 





 05/29/18 07:46 





 05/29/18 07:46 





 











PT  10.9 SECONDS (9.7-12.2)   05/28/18  00:41    


 


INR  1.0   05/28/18  00:41    


 


APTT  31 SECONDS (21-34)   05/28/18  00:41    














- Constitutional


Appears: Non-toxic





- Head Exam


Head Exam: ATRAUMATIC





- Eye Exam


Eye Exam: EOMI





- ENT Exam


ENT Exam: Mucous Membranes Moist





- Neck Exam


Neck Exam: absent: Lymphadenopathy, Thyromegaly





- Respiratory Exam


Respiratory Exam: Clear to Ausculation Bilateral.  absent: Rales





- Cardiovascular Exam


Cardiovascular Exam: REGULAR RHYTHM, Murmur





- GI/Abdominal Exam


GI & Abdominal Exam: Normal Bowel Sounds.  absent: Organomegaly





- Rectal Exam


Rectal Exam: Deferred





- Extremities Exam


Extremities Exam: Normal Capillary Refill.  absent: Calf Tenderness





- Neurological Exam


Neurological Exam: Alert, Oriented x3





- Psychiatric Exam


Psychiatric exam: Normal Affect





- Skin


Skin Exam: Dry





Assessment and Plan


(1) CHF (congestive heart failure)


Assessment & Plan: 


improved after HD


Status: Acute

## 2018-05-29 NOTE — CARD
--------------- APPROVED REPORT --------------





EKG Measurement

Heart [id number]

CT [id number]

[id number]

[id number]

[id number]



<Conclusion>

Normal sinus rhythm

ST & T wave abnormality, consider lateral ischemia

Prolonged QT

Abnormal ECG

## 2018-05-29 NOTE — CP.PCM.PN
Subjective





- Date & Time of Evaluation


Date of Evaluation: 05/29/18


Time of Evaluation: 10:57





- Subjective


Subjective: 





seen and examined


hd yesterday, improved breathing


notes reviewed





no cp/dizziness/cough/palpitations/headache/f/c/v/n/d/rash





Objective





- Vital Signs/Intake and Output


Vital Signs (last 24 hours): 


 











Temp Pulse Resp BP Pulse Ox


 


 98.2 F   75   20   155/94 H  96 


 


 05/29/18 07:00  05/29/18 07:00  05/29/18 07:00  05/29/18 07:00  05/29/18 07:00








Intake and Output: 


 











 05/29/18 05/29/18





 06:59 18:59


 


Intake Total 1000 


 


Balance 1000 














- Medications


Medications: 


 Current Medications





Aspirin (Aspirin Chewable)  81 mg PO DAILY [facility]


   Last Admin: 05/29/18 09:10 Dose:  81 mg


Calcitriol (Rocaltrol)  0.25 mcg PO DAILY [facility]


   Last Admin: 05/29/18 09:10 Dose:  0.25 mcg


Carvedilol (Coreg)  3.125 mg PO BID [facility]


   Last Admin: 05/29/18 09:10 Dose:  3.125 mg


Cinacalcet (Sensipar)  30 mg PO DAILY [facility]


   Last Admin: 05/28/18 10:00 Dose:  Not Given


Epoetin Jonathan (Procrit)  10,000 unit IV MWF [facility]


   Last Admin: 05/28/18 16:30 Dose:  10,000 unit


Heparin Sodium (Porcine) (Heparin)  5,000 units SC Q12 [facility]


   Last Admin: 05/29/18 09:10 Dose:  5,000 units


Lisinopril (Zestril)  5 mg PO DAILY [facility]


   Last Admin: 05/29/18 09:10 Dose:  5 mg


Rosuvastatin Calcium (Crestor)  10 mg PO HS [facility]


   Last Admin: 05/28/18 21:32 Dose:  10 mg


Vitamin B Complex/Vit C/Folic Acid (Nephro-Gisselle)  1 tab PO DAILY [facility]


   Last Admin: 05/29/18 09:10 Dose:  1 tab











- Labs


Labs: 


 





 05/29/18 07:46 





 05/29/18 07:46 





 











PT  10.9 SECONDS (9.7-12.2)   05/28/18  00:41    


 


INR  1.0   05/28/18  00:41    


 


APTT  31 SECONDS (21-34)   05/28/18  00:41    














- Constitutional


Appears: Non-toxic, No Acute Distress





- Head Exam


Head Exam: NORMAL INSPECTION, NORMOCEPHALIC





- Eye Exam


Eye Exam: Normal appearance, PERRL





- ENT Exam


ENT Exam: Mucous Membranes Moist, Normal Exam





- Respiratory Exam


Respiratory Exam: Decreased Breath Sounds, Rales (b/l )





- Cardiovascular Exam


Cardiovascular Exam: REGULAR RHYTHM, RRR





- GI/Abdominal Exam


GI & Abdominal Exam: Distended, Soft





- Extremities Exam


Extremities Exam: Full ROM, Normal Inspection


Additional comments: 





lue avf





- Neurological Exam


Neurological Exam: Alert, Awake





- Psychiatric Exam


Psychiatric exam: Normal Affect, Normal Mood





- Skin


Skin Exam: Dry, Intact





Assessment and Plan


(1) CHF (congestive heart failure)


Status: Acute   





(2) ESRD needing dialysis


Status: Acute   





(3) Respiratory distress


Status: Acute   





(4) ESRD (end stage renal disease)


Status: Acute   





(5) Hypertension


Status: Acute   





(6) Secondary hyperparathyroidism


Status: Acute   





(7) Severe anemia


Status: Acute   





(8) CHF (congestive heart failure)


Status: Suspected   





- Assessment and Plan (Free Text)


Assessment: 





hd tomorrow


fluid restriction


cardiology management

## 2018-05-30 VITALS — HEART RATE: 51 BPM

## 2018-05-30 VITALS — SYSTOLIC BLOOD PRESSURE: 122 MMHG | DIASTOLIC BLOOD PRESSURE: 82 MMHG | OXYGEN SATURATION: 100 % | TEMPERATURE: 97.8 F

## 2018-05-30 LAB
ALBUMIN SERPL-MCNC: 3.9 G/DL
ALBUMIN/GLOB SERPL: 1.4 {RATIO}
ALT SERPL-CCNC: 27 U/L
AST SERPL-CCNC: 16 U/L
BASOPHILS # BLD AUTO: 0 K/UL
BASOPHILS NFR BLD: 0.8 %
BUN SERPL-MCNC: 51 MG/DL
CALCIUM SERPL-MCNC: 8.7 MG/DL
EOSINOPHIL # BLD AUTO: 0.2 K/UL
EOSINOPHIL NFR BLD: 3.1 %
ERYTHROCYTE [DISTWIDTH] IN BLOOD BY AUTOMATED COUNT: 14.8 %
GFR NON-AFRICAN AMERICAN: 5
HGB BLD-MCNC: 9.4 G/DL
LYMPHOCYTES # BLD AUTO: 1.4 K/UL
LYMPHOCYTES NFR BLD AUTO: 29.4 %
MCH RBC QN AUTO: 32.6 PG
MCHC RBC AUTO-ENTMCNC: 35.5 G/DL
MCV RBC AUTO: 91.9 FL
MONOCYTES # BLD: 0.6 K/UL
MONOCYTES NFR BLD: 12.4 %
NEUTROPHILS # BLD: 2.6 K/UL
NEUTROPHILS NFR BLD AUTO: 54.3 %
NRBC BLD AUTO-RTO: 0.2 %
PLATELET # BLD: 140 K/UL
PMV BLD AUTO: 8.2 FL
RBC # BLD AUTO: 2.89 MIL/UL
WBC # BLD AUTO: 4.8 K/UL

## 2018-05-30 RX ADMIN — ERYTHROPOIETIN SCH UNIT: 10000 INJECTION, SOLUTION INTRAVENOUS; SUBCUTANEOUS at 11:14

## 2018-05-30 NOTE — CP.PCM.PN
Subjective





- Date & Time of Evaluation


Date of Evaluation: 05/30/18


Time of Evaluation: 11:29





- Subjective


Subjective: 





seen and examined


on hd


estimated uf 3.5L


breathing improved


no cp/sob/dizziness/palpitations/n/v/d/rash/headache/f/c





Objective





- Vital Signs/Intake and Output


Vital Signs (last 24 hours): 


 











Temp Pulse Resp BP Pulse Ox


 


 97.3 F L  82   20   137/100 H  98 


 


 05/30/18 08:45  05/30/18 08:45  05/30/18 08:45  05/30/18 11:15  05/30/18 08:45








Intake and Output: 


 











 05/30/18 05/30/18





 06:59 18:59


 


Intake Total 800 


 


Balance 800 














- Medications


Medications: 


 Current Medications





Aspirin (Aspirin Chewable)  81 mg PO DAILY [facility]


   Last Admin: 05/29/18 09:10 Dose:  81 mg


Calcitriol (Rocaltrol)  0.25 mcg PO DAILY [facility]


   Last Admin: 05/29/18 09:10 Dose:  0.25 mcg


Carvedilol (Coreg)  6.25 mg PO BID [facility]


Cinacalcet (Sensipar)  30 mg PO DAILY [facility]


   Last Admin: 05/29/18 12:20 Dose:  30 mg


Epoetin Jonathan (Procrit)  10,000 unit IV MWF [facility]


   Last Admin: 05/30/18 11:14 Dose:  10,000 unit


Heparin Sodium (Porcine) (Heparin)  5,000 units SC Q12 [facility]


   Last Admin: 05/29/18 21:09 Dose:  5,000 units


Lisinopril (Zestril)  5 mg PO DAILY [facility]


   Last Admin: 05/29/18 09:10 Dose:  5 mg


Rosuvastatin Calcium (Crestor)  10 mg PO HS [facility]


   Last Admin: 05/29/18 21:09 Dose:  10 mg


Vitamin B Complex/Vit C/Folic Acid (Nephro-Gisselle)  1 tab PO DAILY [facility]


   Last Admin: 05/29/18 09:10 Dose:  1 tab











- Labs


Labs: 


 





 05/30/18 07:41 





 05/30/18 07:41 





 











PT  10.9 SECONDS (9.7-12.2)   05/28/18  00:41    


 


INR  1.0   05/28/18  00:41    


 


APTT  31 SECONDS (21-34)   05/28/18  00:41    














- Constitutional


Appears: Non-toxic, No Acute Distress





- Head Exam


Head Exam: NORMAL INSPECTION, NORMOCEPHALIC





- Eye Exam


Eye Exam: Normal appearance, PERRL





- ENT Exam


ENT Exam: Mucous Membranes Moist, Normal Exam





- Neck Exam


Neck Exam: Full ROM, Normal Inspection





- Respiratory Exam


Respiratory Exam: Decreased Breath Sounds, NORMAL BREATHING PATTERN





- Cardiovascular Exam


Cardiovascular Exam: REGULAR RHYTHM, RRR





- GI/Abdominal Exam


GI & Abdominal Exam: Distended, Soft





- Extremities Exam


Extremities Exam: Full ROM, Normal Inspection





- Neurological Exam


Neurological Exam: Alert, Awake, Oriented x3





- Psychiatric Exam


Psychiatric exam: Normal Affect, Normal Mood





- Skin


Skin Exam: Dry, Intact





Assessment and Plan


(1) CHF (congestive heart failure)


Status: Acute   





(2) ESRD needing dialysis


Status: Acute   





(3) Respiratory distress


Status: Acute   





(4) ESRD (end stage renal disease)


Status: Acute   





(5) Hypertension


Status: Acute   





(6) Secondary hyperparathyroidism


Status: Acute   





(7) Severe anemia


Status: Acute   





(8) CHF (congestive heart failure)


Status: Suspected   





- Assessment and Plan (Free Text)


Assessment: 





maintain hd mwf


fluid restriction


stable from renal standpoint

## 2018-05-30 NOTE — CP.PCM.DIS
<Cely Payton - Last Filed: 05/30/18 14:20>





Provider





- Provider


Date of Admission: 


05/28/18 03:09





Attending physician: 


Augusto Mart MD





Primary care physician: 


[facility]


Consults: 


Nephrology: Dr. Regan


Cardiologist: Dr. Vega


Time Spent in preparation of Discharge (in minutes): 45





Hospital Course





- Lab Results


Lab Results: 


 Most Recent Lab Values











WBC  5.5 K/uL (4.8-10.8)   05/29/18  07:46    


 


RBC  2.97 Mil/uL (4.40-5.90)  L  05/29/18  07:46    


 


Hgb  9.6 g/dL (12.0-18.0)  L  05/29/18  07:46    


 


Hct  27.3 % (35.0-51.0)  L  05/29/18  07:46    


 


MCV  91.8 fL (80.0-94.0)   05/29/18  07:46    


 


MCH  32.4 pg (27.0-31.0)  H  05/29/18  07:46    


 


MCHC  35.3 g/dL (33.0-37.0)   05/29/18  07:46    


 


RDW  14.7 % (11.5-14.5)  H  05/29/18  07:46    


 


Plt Count  142 K/uL (130-400)   05/29/18  07:46    


 


MPV  8.4 fL (7.2-11.7)   05/29/18  07:46    


 


Neut % (Auto)  65.8 % (50.0-75.0)   05/29/18  07:46    


 


Lymph % (Auto)  21.1 % (20.0-40.0)   05/29/18  07:46    


 


Mono % (Auto)  10.1 % (0.0-10.0)  H  05/29/18  07:46    


 


Eos % (Auto)  2.5 % (0.0-4.0)   05/29/18  07:46    


 


Baso % (Auto)  0.5 % (0.0-2.0)   05/29/18  07:46    


 


Neut # (Auto)  3.6 K/uL (1.8-7.0)   05/29/18  07:46    


 


Lymph # (Auto)  1.2 K/uL (1.0-4.3)   05/29/18  07:46    


 


Mono # (Auto)  0.6 K/uL (0.0-0.8)   05/29/18  07:46    


 


Eos # (Auto)  0.1 K/uL (0.0-0.7)   05/29/18  07:46    


 


Baso # (Auto)  0.0 K/uL (0.0-0.2)   05/29/18  07:46    


 


PT  10.9 SECONDS (9.7-12.2)   05/28/18  00:41    


 


INR  1.0   05/28/18  00:41    


 


APTT  31 SECONDS (21-34)   05/28/18  00:41    


 


Sodium  143 mmol/L (132-148)   05/29/18  07:46    


 


Potassium  4.3 mmol/L (3.6-5.2)   05/29/18  07:46    


 


Chloride  100 mmol/L ()   05/29/18  07:46    


 


Carbon Dioxide  29 mmol/L (22-30)   05/29/18  07:46    


 


Anion Gap  19  (10-20)   05/29/18  07:46    


 


BUN  36 mg/dL (9-20)  H  05/29/18  07:46    


 


Creatinine  9.4 mg/dL (0.8-1.5)  H* D 05/29/18  07:46    


 


Est GFR ( Amer)  8   05/29/18  07:46    


 


Est GFR (Non-Af Amer)  6   05/29/18  07:46    


 


Random Glucose  87 mg/dL ()   05/29/18  07:46    


 


Hemoglobin A1c  4.9 % (4.2-6.5)   05/28/18  07:04    


 


Calcium  8.5 mg/dl (8.6-10.4)  L  05/29/18  07:46    


 


Phosphorus  5.2 mg/dL (2.5-4.5)  H  05/29/18  07:46    


 


Magnesium  2.4 mg/dL (1.6-2.3)  H  05/29/18  07:46    


 


Iron  40 ug/dL ()  L  05/28/18  21:46    


 


TIBC  184 ug/dL (250-450)  L  05/28/18  21:46    


 


% Saturation  22  (20-55)   05/28/18  21:46    


 


Ferritin  747.0 ng/mL  05/28/18  04:36    


 


Total Bilirubin  0.6 mg/dL (0.2-1.3)   05/29/18  07:46    


 


AST  15 U/L (17-59)  L  05/29/18  07:46    


 


ALT  26 U/L (21-72)   05/29/18  07:46    


 


Alkaline Phosphatase  41 U/L ()   05/29/18  07:46    


 


Troponin I  0.0390 ng/mL (0.00-0.120)   05/28/18  00:41    


 


NT-Pro-B Natriuret Pep  09018 pg/mL (0-450)  H  05/28/18  00:41    


 


Total Protein  6.8 g/dL (6.3-8.3)   05/29/18  07:46    


 


Albumin  4.0 g/dL (3.5-5.0)   05/29/18  07:46    


 


Globulin  2.8 gm/dL (2.2-3.9)   05/29/18  07:46    


 


Albumin/Globulin Ratio  1.4  (1.0-2.1)   05/29/18  07:46    


 


Triglycerides  101 mg/dL (0-149)  D 05/28/18  04:36    


 


Cholesterol  143 mg/dL (0-199)   05/28/18  04:36    


 


LDL Cholesterol Direct  67 mg/dL (0-129)   05/28/18  04:36    


 


HDL Cholesterol  30 mg/dL (30-70)   05/28/18  04:36    


 


Lipase  179 U/L ()   05/28/18  00:41    


 


Free T4  1.11 ng/dL (0.78-2.19)   05/28/18  04:02    


 


TSH 3rd Generation  0.63 mIU/L (0.46-4.68)   05/28/18  04:36    














- Hospital Course


Hospital Course: 


This is a 34 yo male, originally from [place], with past medical hx of CHF 

with reduced ejection fraction, cardiomyopathy, pulmonary hypertension, renal 

cysts, and ESRD, presenting today to [facility] with chief complaint of SOB. Pt 

is accompanied by wife. Wife says he drove pt into ER. Pt has been having sob 

that started on 7 pm this past evening. pt was attempting to go to sleep and 

could not. He also reports non productive cough. He is chronically short of 

breath and it was worsening today. Pt is end stage renal. Wife could not say 

the reason. Denies fevers, chills, cp, palpitations. He has been on dialysis 

for 2 yrs. He gets dialysis through left av fistula. He does not miss sessions. 

He sees Dr. Regan. He cannot say how much fluid was taken off last visit. He 

does not produce urine. He is compliant with all medications. He was given IV 

lasix in ER to help with vasodilation. 








PMD: Lane/TING in [facility]


Specialists: Jass- nephrology


PMH: CHF, dilated cardiomyopathy, pulmonary hypertension, renal cysts, ESRD on 

dialysis MWF 


PSH: av fistula left upper ext, vein mapping


FH: father- living- no medical problems; mother- living- HTN


Social hx: Denies smoking. Denies secondhand smoke. Denies drinking. Denies 

drug use. Born in [place]. Does not work. Fully mobile, able to do all ADLs. 


Allergies: NKDA


Home meds: sensipar, lisinopril?, cardizem?, could not say for sure his home 

medications; needs med rec.





Hospital Course: Patient was admitted on 5/28/18 for CHF exacerbation and ESRD 

needing dialysis. Chest xray was ordered and showed pulmonary edema. 

Echocardiogram from previous admission on 5/2/18 showed an EF 45-50%, mildly 

impaired LV function, moderate pulm hypertension. Cardiology was consulted, Dr. Vega. Patient was placed on telemetry, BiPAP, Aspirin 81mg daily, crestor 

10mg HS. Ordered daily weights, intake/output was monitored, and patient was 

placed on fluid restricted diet. A1c, thyroid panel and BNO were ordered. A1c 

and thyroid panel was within normal range; BNP was elevated at 77,000. 

Nephrology, Dr. Regan, was consulted. Patient continued regular Monday, 

Wednesday, Friday scheduled and resumed medications (nephrovite, calcitriol, 

and sensipar). On 5/29, patient was seen and examined, lungs were clear to 

auscultation, no edema noted on extremities, and patient denies dyspnea, cough, 

palpitations, chest pain, leg pain/swelling. The patient weight decreased from 

154 at admission to 148 on 5/30/18. Patient seen and examined on 5/30; he is 

scheduled for dialysis on 5/30 and reports all symptoms have resolved. Patient'

s coreg was increased from 3.125mg PO BID to 6.25mg PO BID to achieve better 

control of blood pressure. Patient is stable for discharge to home. Patient 

must follow heart failure and renal diet. Patient must continue regular 

dialysis schedule of MWF at [facility] on [address]. Patient 

must follow up with nephrologist and PMD within 1 week of discharge. 





This is a brief summary of the hospital course. Please see EMR for more details.





Discharge Exam





- Additional Findings


Additional findings: 


- Constitutional


Appears: No Acute Distress





- Head Exam


Head Exam: ATRAUMATIC, NORMAL INSPECTION





- Eye Exam


Eye Exam: EOMI, Normal appearance





- ENT Exam


ENT Exam: Mucous Membranes Moist





- Respiratory Exam


Respiratory Exam: NORMAL BREATHING PATTERN.  absent: Rales, Rhonchi, Wheezes, 

Respiratory Distress





- Cardiovascular Exam


Cardiovascular Exam: +S1, +S2, Murmur





- GI/Abdominal Exam


GI & Abdominal Exam: Soft, Normal Bowel Sounds.  absent: Distended, Firm, 

Tenderness





- Extremities Exam


Extremities Exam: Normal Inspection.  absent: Pedal Edema, Tenderness


Additional comments: Left Forearm- AV fistula, + thrill





- Neurological Exam


Neurological Exam: Alert, Awake, Oriented x3





- Psychiatric Exam


Psychiatric exam: Normal Affect, Normal Mood





- Skin


Skin Exam: Dry, Intact, Normal Color, Warm





Discharge Plan





- Discharge Medications


Prescriptions: 


Aspirin [Aspirin Chewable] 81 mg PO DAILY #30 chew


Calcitriol [Rocaltrol] 0.25 mcg PO DAILY #30 sgl


Carvedilol [Coreg] 6.25 mg PO BID #60 tab


Cinacalcet [Sensipar] 30 mg PO DAILY #30 tab


Lisinopril [Zestril] 5 mg PO DAILY #30 tab


Rosuvastatin Calcium [Crestor] 10 mg PO HS #30 tab


Vitamin B Complex/Vit C/Folic [Nephro-Gisselle] 1 tab PO DAILY #30 tab





- Follow Up Plan


Condition: FAIR


Disposition: HOME/ ROUTINE


Instructions:  Dialysis Diet , Heart Failure, Adult (DC), Hemodialysis (DC), 

End Stage Kidney Disease (DC)


Additional Instructions: 


Patient is stable for discharge to home after completion of dialysis. 


Patient must continue medications:


1. ASA 81mg PO daily- take 1 tablet by mouth daily (breakfast)


2. Coreg 6.25mg PO BID- take 1 tablet by mouth twice a day (breakfast, dinner)


3. Lisinopril 5mg PO daily- take 1 tablet by mouth daily (lunch)


4. Crestor 10mg PO HS- take 1 tablet by mouth in the evening (dinner)


5. Calcitriol 0.25mcg PO daily- take 1 tablet by mouth daily (breakfast)


6. Sensipar 30mg PO daily- take 1 tablet by mouth daily (breakfast)


7. Nephro-Gisselle- take 1 tablet by mouth daily (breakfast)





Patient must continue heart failure and renal diet- information provided by 

dietician.


Patient must continue dialysis schedule MWF at [facility] on 

[address]


Patient must follow up with their PMD at [facility] within one week of 

discharge.


Patient must follow up with nephrologist, Dr. Regan, within one week of 

discharge.





If symptoms reoccur or worsen, patient should return to the nearest ER.








El paciente est estable para la descarga a florian casa despus de la finalizacin 

de la dilisis.


El paciente debe continuar con los medicamentos:


1. ASA 81mg PO diario - tome 1 tableta por la boca diariamente (desayuno)


2. Coreg 6.25mg PO BID- tome 1 tableta por va oral dos veces al da (desayuno, 

nia)


3. Lisinopril 5mg PO diario - tome 1 tableta por la boca diariamente (almuerzo)


4. Crestor 10mg PO HS- den 1 tableta por la boca en la noche (nia)


5. Calcitriol 0.25mcg por da: tome 1 tableta por la boca diariamente (desayuno)


6. Sensipar 30mg PO diario - tome 1 tableta por la boca diariamente (desayuno)


7. Nephro-Gisselle- tome 1 tableta por la boca diariamente (desayuno)





El paciente debe continuar la insuficiencia cardaca y la informacin sobre la 

dieta renal proporcionada por un dietista.


El paciente debe continuar el programa de dilisis MWF en [facility] en [address]


El paciente debe realizar un seguimiento con florian PMD en [facility] 

dentro de orquidea semana de el sido dado de basil.


El paciente debe realizar un seguimiento con el nefrlogo, el Dr. Regan, 

dentro de la semana posterior al basil.





Si los sntomas reaparecen o empeoran, el paciente debe regresar al servicio de 

urgencias ms ubaldo.


Referrals: 


Jon Regan MD [Staff Provider] - 





<Augusto Mart - Last Filed: 05/30/18 17:27>





Provider





- Provider


Date of Admission: 


05/28/18 03:09





Attending physician: 


Augusto Mart MD





Time Spent in preparation of Discharge (in minutes): 40





Hospital Course





- Lab Results


Lab Results: 


 Most Recent Lab Values











WBC  4.8 K/uL (4.8-10.8)   05/30/18  07:41    


 


RBC  2.89 Mil/uL (4.40-5.90)  L  05/30/18  07:41    


 


Hgb  9.4 g/dL (12.0-18.0)  L  05/30/18  07:41    


 


Hct  26.6 % (35.0-51.0)  L  05/30/18  07:41    


 


MCV  91.9 fL (80.0-94.0)   05/30/18  07:41    


 


MCH  32.6 pg (27.0-31.0)  H  05/30/18  07:41    


 


MCHC  35.5 g/dL (33.0-37.0)   05/30/18  07:41    


 


RDW  14.8 % (11.5-14.5)  H  05/30/18  07:41    


 


Plt Count  140 K/uL (130-400)   05/30/18  07:41    


 


MPV  8.2 fL (7.2-11.7)   05/30/18  07:41    


 


Neut % (Auto)  54.3 % (50.0-75.0)   05/30/18  07:41    


 


Lymph % (Auto)  29.4 % (20.0-40.0)   05/30/18  07:41    


 


Mono % (Auto)  12.4 % (0.0-10.0)  H  05/30/18  07:41    


 


Eos % (Auto)  3.1 % (0.0-4.0)   05/30/18  07:41    


 


Baso % (Auto)  0.8 % (0.0-2.0)   05/30/18  07:41    


 


Neut # (Auto)  2.6 K/uL (1.8-7.0)   05/30/18  07:41    


 


Lymph # (Auto)  1.4 K/uL (1.0-4.3)   05/30/18  07:41    


 


Mono # (Auto)  0.6 K/uL (0.0-0.8)   05/30/18  07:41    


 


Eos # (Auto)  0.2 K/uL (0.0-0.7)   05/30/18  07:41    


 


Baso # (Auto)  0.0 K/uL (0.0-0.2)   05/30/18  07:41    


 


PT  10.9 SECONDS (9.7-12.2)   05/28/18  00:41    


 


INR  1.0   05/28/18  00:41    


 


APTT  31 SECONDS (21-34)   05/28/18  00:41    


 


Sodium  142 mmol/L (132-148)   05/30/18  07:41    


 


Potassium  4.6 mmol/L (3.6-5.2)   05/30/18  07:41    


 


Chloride  101 mmol/L ()   05/30/18  07:41    


 


Carbon Dioxide  25 mmol/L (22-30)   05/30/18  07:41    


 


Anion Gap  20  (10-20)   05/30/18  07:41    


 


BUN  51 mg/dL (9-20)  H  05/30/18  07:41    


 


Creatinine  12.3 mg/dL (0.8-1.5)  H* D 05/30/18  07:41    


 


Est GFR ( Amer)  6   05/30/18  07:41    


 


Est GFR (Non-Af Amer)  5   05/30/18  07:41    


 


Random Glucose  80 mg/dL ()   05/30/18  07:41    


 


Hemoglobin A1c  4.9 % (4.2-6.5)   05/28/18  07:04    


 


Calcium  8.7 mg/dl (8.6-10.4)   05/30/18  07:41    


 


Phosphorus  5.3 mg/dL (2.5-4.5)  H  05/30/18  07:41    


 


Magnesium  2.7 mg/dL (1.6-2.3)  H  05/30/18  07:41    


 


Iron  40 ug/dL ()  L  05/28/18  21:46    


 


TIBC  184 ug/dL (250-450)  L  05/28/18  21:46    


 


% Saturation  22  (20-55)   05/28/18  21:46    


 


Ferritin  747.0 ng/mL  05/28/18  04:36    


 


Total Bilirubin  0.6 mg/dL (0.2-1.3)   05/30/18  07:41    


 


AST  16 U/L (17-59)  L  05/30/18  07:41    


 


ALT  27 U/L (21-72)   05/30/18  07:41    


 


Alkaline Phosphatase  41 U/L ()   05/30/18  07:41    


 


Troponin I  0.0390 ng/mL (0.00-0.120)   05/28/18  00:41    


 


NT-Pro-B Natriuret Pep  35805 pg/mL (0-450)  H  05/28/18  00:41    


 


Total Protein  6.7 g/dL (6.3-8.3)   05/30/18  07:41    


 


Albumin  3.9 g/dL (3.5-5.0)   05/30/18  07:41    


 


Globulin  2.8 gm/dL (2.2-3.9)   05/30/18  07:41    


 


Albumin/Globulin Ratio  1.4  (1.0-2.1)   05/30/18  07:41    


 


Triglycerides  101 mg/dL (0-149)  D 05/28/18  04:36    


 


Cholesterol  143 mg/dL (0-199)   05/28/18  04:36    


 


LDL Cholesterol Direct  67 mg/dL (0-129)   05/28/18  04:36    


 


HDL Cholesterol  30 mg/dL (30-70)   05/28/18  04:36    


 


Lipase  179 U/L ()   05/28/18  00:41    


 


Free T4  1.11 ng/dL (0.78-2.19)   05/28/18  04:02    


 


TSH 3rd Generation  0.63 mIU/L (0.46-4.68)   05/28/18  04:36    














Attending/Attestation





- Attestation


I have personally seen and examined this patient.: Yes


I have fully participated in the care of the patient.: Yes


I have reviewed all pertinent clinical information, including history, physical 

exam and plan: Yes


Notes (Text): 





05/30/18 17:27


Patient was seen and examined at 7:45 AM.





Discharge instructions were gone over with resident Dr. Goodson.





Augusto Mart D.O.

## 2018-06-28 ENCOUNTER — HOSPITAL ENCOUNTER (INPATIENT)
Dept: HOSPITAL 31 - C.ER | Age: 33
LOS: 7 days | Discharge: HOME | DRG: 539 | End: 2018-07-05
Attending: FAMILY MEDICINE | Admitting: FAMILY MEDICINE
Payer: SELF-PAY

## 2018-06-28 VITALS — BODY MASS INDEX: 25.8 KG/M2

## 2018-06-28 DIAGNOSIS — D63.1: ICD-10-CM

## 2018-06-28 DIAGNOSIS — T82.858A: ICD-10-CM

## 2018-06-28 DIAGNOSIS — Z79.82: ICD-10-CM

## 2018-06-28 DIAGNOSIS — I50.42: ICD-10-CM

## 2018-06-28 DIAGNOSIS — I13.2: ICD-10-CM

## 2018-06-28 DIAGNOSIS — I95.2: ICD-10-CM

## 2018-06-28 DIAGNOSIS — I27.20: ICD-10-CM

## 2018-06-28 DIAGNOSIS — E04.1: ICD-10-CM

## 2018-06-28 DIAGNOSIS — I45.81: ICD-10-CM

## 2018-06-28 DIAGNOSIS — N18.6: ICD-10-CM

## 2018-06-28 DIAGNOSIS — D69.3: ICD-10-CM

## 2018-06-28 DIAGNOSIS — I08.3: ICD-10-CM

## 2018-06-28 DIAGNOSIS — K75.9: ICD-10-CM

## 2018-06-28 DIAGNOSIS — I42.0: ICD-10-CM

## 2018-06-28 DIAGNOSIS — J93.83: Primary | ICD-10-CM

## 2018-06-28 DIAGNOSIS — Z79.899: ICD-10-CM

## 2018-06-28 DIAGNOSIS — N02.8: ICD-10-CM

## 2018-06-28 DIAGNOSIS — E78.00: ICD-10-CM

## 2018-06-28 DIAGNOSIS — Z99.2: ICD-10-CM

## 2018-06-28 DIAGNOSIS — T44.7X5A: ICD-10-CM

## 2018-06-28 DIAGNOSIS — E87.5: ICD-10-CM

## 2018-06-28 LAB
ALBUMIN SERPL-MCNC: 5 G/DL (ref 3.5–5)
ALBUMIN/GLOB SERPL: 1.5 {RATIO} (ref 1–2.1)
ALT SERPL-CCNC: 40 U/L (ref 21–72)
AST SERPL-CCNC: 40 U/L (ref 17–59)
BASOPHILS # BLD AUTO: 0 K/UL (ref 0–0.2)
BASOPHILS NFR BLD: 0.7 % (ref 0–2)
BUN SERPL-MCNC: 43 MG/DL (ref 9–20)
BUN SERPL-MCNC: 50 MG/DL (ref 9–20)
CALCIUM SERPL-MCNC: 10.4 MG/DL (ref 8.6–10.4)
CALCIUM SERPL-MCNC: 9.1 MG/DL (ref 8.6–10.4)
EOSINOPHIL # BLD AUTO: 0.1 K/UL (ref 0–0.7)
EOSINOPHIL NFR BLD: 2.1 % (ref 0–4)
ERYTHROCYTE [DISTWIDTH] IN BLOOD BY AUTOMATED COUNT: 15 % (ref 11.5–14.5)
GFR NON-AFRICAN AMERICAN: 6
GFR NON-AFRICAN AMERICAN: 6
HGB BLD-MCNC: 14.8 G/DL (ref 12–18)
LYMPHOCYTES # BLD AUTO: 1.5 K/UL (ref 1–4.3)
LYMPHOCYTES NFR BLD AUTO: 23.4 % (ref 20–40)
MCH RBC QN AUTO: 31.7 PG (ref 27–31)
MCHC RBC AUTO-ENTMCNC: 33.6 G/DL (ref 33–37)
MCV RBC AUTO: 94.5 FL (ref 80–94)
MONOCYTES # BLD: 0.7 K/UL (ref 0–0.8)
MONOCYTES NFR BLD: 10.6 % (ref 0–10)
NEUTROPHILS # BLD: 4 K/UL (ref 1.8–7)
NEUTROPHILS NFR BLD AUTO: 63.2 % (ref 50–75)
NRBC BLD AUTO-RTO: 0.1 % (ref 0–2)
PLATELET # BLD: 185 K/UL (ref 130–400)
PMV BLD AUTO: 9.2 FL (ref 7.2–11.7)
RBC # BLD AUTO: 4.67 MIL/UL (ref 4.4–5.9)
TROPONIN I SERPL-MCNC: 0.01 NG/ML (ref 0–0.12)
WBC # BLD AUTO: 6.3 K/UL (ref 4.8–10.8)

## 2018-06-28 PROCEDURE — 0W9930Z DRAINAGE OF RIGHT PLEURAL CAVITY WITH DRAINAGE DEVICE, PERCUTANEOUS APPROACH: ICD-10-PCS

## 2018-06-28 NOTE — RAD
HISTORY:



COMPARISON:

02/12/2016.



TECHNIQUE:

Chest PA and lateral



FINDINGS:



LINES AND TUBES:

None. 



LUNG AND PLEURA:

The lungs are well inflated and clear. There is a large right 

pneumothorax with compressive atelectasis of the underlying lung and 

mild shift of mediastinum to the left. 



HEART AND MEDIASTINUM:

The heart is not enlarged. 



SKELETAL STRUCTURES:

The bony structures are within normal limits for the patient's age.



VISUALIZED UPPER ABDOMEN:

Normal.



OTHER FINDINGS:

None.



IMPRESSION:

Large right pneumothorax with compressive atelectasis of the right 

lung and mild shift of mediastinum to the left.



Critical findings were discussed with Dr. Ashely Richter on 

06/28/2018 at 1:33 p.m.

## 2018-06-28 NOTE — C.PDOC
History Of Present Illness


33 year old male, whose PMHx includes HTN and Hypercholesterolemia, presents to 

the ED for evaluation of right-sided chest pain and left upper back pain which 

began 3 days ago. Patient states pain is sharp and worse with taking deep 

breaths. Patient denies fever, chills, cough, recent trauma/injury or history 

of smoking.


Time Seen by Provider: 18 12:32


Chief Complaint (Nursing): Chest Pain


History Per: Patient


History/Exam Limitations: no limitations


Onset/Duration Of Symptoms: Days (3)


Current Symptoms Are (Timing): Still Present


Quality: Sharp, "Pain"


Exacerbating Factors: Deep Breathing


Additional History Per: Patient





Past Medical History


Reviewed: Historical Data, Nursing Documentation, Vital Signs


Vital Signs: 


 Last Vital Signs











Temp  98 F   18 14:40


 


Pulse  65   18 14:40


 


Resp  18   18 14:40


 


BP  83/69 L  18 14:40


 


Pulse Ox  98   18 14:40














- Medical History


PMH: HTN, Hypercholesterolemia, End Stage Renal Disease


   Denies: Chronic Kidney Disease


Surgical History: No Surg Hx





- [facility] Procedures








 (18)


BYPASS LEFT RADIAL ARTERY TO LOWER ARM VEIN, OPEN APPROACH (16)


INSERTION OF INFUSION DEV INTO SUP VENA CAVA, PERC APPROACH (16)


MEASURE CARDIAC SAMPL & PRESSURE, BILATERAL, PERC (16)


PERFORMANCE OF URINARY FILTRATION, MULTIPLE (16)


PLAIN RADIOGRAPHY OF R & L HEART USING L OSM CONTRAST (16)








Family History: States: Unknown Family Hx





- Social History


Hx Alcohol Use: No


Hx Substance Use: No





- Immunization History


Hx Tetanus Toxoid Vaccination: No


Hx Influenza Vaccination: No


Hx Pneumococcal Vaccination: No





Review Of Systems


Constitutional: Negative for: Fever, Chills


Cardiovascular: Positive for: Chest Pain (right-sided )


Respiratory: Negative for: Cough


Musculoskeletal: Positive for: Back Pain (left-sided, upper )





Physical Exam





- Physical Exam


Appears: Non-toxic, No Acute Distress


Skin: Normal Color, Warm, Dry


Head: Atraumatic, Normacephalic


Eye(s): bilateral: Normal Inspection


Oral Mucosa: Moist


Neck: Supple


Chest: Symmetrical, No Deformity, No Tenderness


Cardiovascular: Rhythm Regular, No Murmur


Respiratory: Decreased Breath Sounds (right-sided ), No Rales, No Rhonchi, No 

Wheezing


Back: No Vertebral Tenderness, Paraspinal Tenderness (thoracic )


Extremity: Normal ROM, Capillary Refill (less than 2 seconds )


Neurological/Psych: Oriented x3, Normal Speech, Normal Cognition





ED Course And Treatment





- Laboratory Results


Result Diagrams: 


 18 13:12





 18 13:12


ECG: Interpreted By Me, Viewed By Me


ECG Rhythm: Sinus Rhythm


Interpretation Of ECG: Normal Sinus Rhythm at rate 72bpm. Normal axis. Normal 

intervals. Nonspecific ST/T wave changes.


Rate From EC


O2 Sat by Pulse Oximetry: 97 (on RA)


Pulse Ox Interpretation: Normal





- Other Rad


  ** CXR


X-Ray: Interpreted by Me, Viewed By Me, Read By Radiologist


Interpretation: HISTORY:  COMPARISON:  2016.  TECHNIQUE:  Chest PA and 

lateral.  FINDINGS:  LINES AND TUBES:  None.  LUNG AND PLEURA:  The lungs are 

well inflated and clear. There is a large right pneumothorax with compressive 

atelectasis of the underlying lung and mild shift of mediastinum to the left.  

HEART AND MEDIASTINUM:  The heart is not enlarged.  SKELETAL STRUCTURES:  The 

bony structures are within normal limits for the patient's age.  VISUALIZED 

UPPER ABDOMEN:  Normal.  OTHER FINDINGS:  None.  IMPRESSION:  Large right 

pneumothorax with compressive atelectasis of the right lung and mild shift of 

mediastinum to the left.  Critical findings were discussed with Dr. Ashely Richter on 2018 at 1:33 p.m.





Medical Decision Making


Medical Decision Making: 





Assessment: Chest pain 


Plan:


* bloodwork 


* urinalysis 


* CXR


* EKG


* Toradol IVP 


* reassess and disposition 





Progress: 


Bloodwork, urinalysis, CXR, and EKG ordered and reviewed. 


Toradol IVP administered. 





Surgical resident notified about case at 13:35.


Dr. Roman consulted. 


Surgical resident at bedside and placed chest tube





1519: hospitalist notified and will admit to medical surgical floor 





Disposition


Discussed With DrSonny: Augusto Mart


Doctor Will See Patient In The: Hospital


Counseled Patient/Family Regarding: Studies Performed, Diagnosis





- Disposition


Disposition: HOSPITALIZED


Disposition Time: 15:30


Condition: FAIR


Forms:  [facility] (English)





- Clinical Impression


Clinical Impression: 


 Pneumothorax








- Scribe Statement


The provider has reviewed the documentation as recorded by the Scribe (Ivone 

Mart)


Provider Attestation: 








All medical record entries made by the Scribe were at my direction and 

personally dictated by me. I have reviewed the chart and agree that the record 

accurately reflects my personal performance of the history, physical exam, 

medical decision making, and the department course for this patient. I have 

also personally directed, reviewed, and agree with the discharge instructions 

and disposition.

## 2018-06-28 NOTE — CP.PCM.HP
<Yovany Collado - Last Filed: 06/28/18 17:31>





History of Present Illness





- History of Present Illness


History of Present Illness: 


This is a 34 yo male, originally from [place], with past medical hx of CHF 

systolic/diastolic dysfunction, cardiomyopathy, pulmonary hypertension, renal 

cysts, and ESRD on HD, presenting today to [facility] [place] ER with chief complaint of 

right-sided chest pain and shortness of breath. He stated he woke up 4 days ago 

with right-sided chest pain which he thought would go away on it's own. However 

the pain worsened, he also had dyspnea which he stated prompted him to take 

deep breaths. He admitted to increased shortness of breath with ambulation. He 

denies trauma, palpitations, abdominal pain, urinary sx, diarrhea, constipation

, fevers, cough. He states he's complaint with his medications and has not 

missed a HD session.





PMD: Lane/TING in [facility] [place] clinic


Specialists: Jass- nephrology


PMH: CHF, dilated cardiomyopathy, pulmonary hypertension, renal cysts, ESRD on 

dialysis MWF (regular dialysis schedule of MWF at [facility] on 

[address])


PSH: av fistula left upper ext, vein mapping


FH: father- living- no medical problems; mother- living- HTN


Social hx: Denies smoking. Denies secondhand smoke. Denies drinking. Denies 

drug use. Born in [place]. Does not work. Fully mobile, able to do all ADLs. 


Allergies: NKDA


Home meds: could not recall; sensipar, lisinopril?, cardizem?, per chart review 

nephro-isaias 1 tab qd, crestor 10mg po hs, lisinopril 5mg po qd, sensipar 30mg 

po qd, coreg 6.25mg po bid, calcitriol 0.25mcg po qd, asa 81mg po qd





Present on Admission





- Present on Admission


Any Indicators Present on Admission: No





Review of Systems





- Constitutional


Constitutional: Fatigue.  absent: Chills, Fever, Sleep Apnea





- EENT


Eyes: absent: Blurred Vision


Nose/Mouth/Throat: absent: Nasal Congestion





- Cardiovascular


Cardiovascular: Chest Pain, Chest Pain at Rest, Dyspnea.  absent: Chest Pain 

with Activity, Claudication, Diaphoresis, Lightheadedness, Orthopnea, Slow 

Heart Rate, Syncope





- Respiratory


Respiratory: absent: Cough, Hemoptysis, Wheezing, Chest Congestion





- Gastrointestinal


Gastrointestinal: absent: Abdominal Pain, Constipation, Diarrhea, Vomiting





- Genitourinary


Genitourinary: absent: Dysuria





- Integumentary


Integumentary: absent: Bleeding Lesions





- Neurological


Neurological: absent: Confusion





Past Patient History





- Infectious Disease


Hx of Infectious Diseases: None





- Tetanus Immunizations


Tetanus Immunization: Unknown





- Past Medical History & Family History


Past Medical History?: Yes





- Past Social History


Smoking Status: Never Smoked





- CARDIAC


Hx Hypercholesterolemia: Yes


Hx Hypertension: Yes





- PULMONARY


Other/Comment: SOB A COUPLE DAYS AGO





- NEUROLOGICAL


Hx Neurological Disorder: No





- HEENT


Other/Comment: L EYE BLURRY





- RENAL


Hx Chronic Kidney Disease: No





- ENDOCRINE/METABOLIC


Hx Endocrine Disorders: No





- HEMATOLOGICAL/ONCOLOGICAL


Hx Blood Disorders: No





- INTEGUMENTARY


Hx Dermatological Problems: No





- MUSCULOSKELETAL/RHEUMATOLOGICAL


Hx Musculoskeletal Disorders: No


Hx Falls: No





- GASTROINTESTINAL


Hx Gastrointestinal Disorders: No





- GENITOURINARY/GYNECOLOGICAL


Other/Comment: Dilaysis ; MWF





- PSYCHIATRIC


Hx Substance Use: No





- SURGICAL HISTORY


Hx Surgeries: No


Other/Comment: Lt. arm AV shunt insertion





- ANESTHESIA


Hx Anesthesia: No


Hx Anesthesia Reactions: No





Meds


Allergies/Adverse Reactions: 


 Allergies











Allergy/AdvReac Type Severity Reaction Status Date / Time


 


No Known Allergies Allergy   Verified 06/28/18 12:00














Physical Exam





- Constitutional


Appears: Well, Non-toxic, No Acute Distress





- Head Exam


Head Exam: ATRAUMATIC, NORMAL INSPECTION





- Eye Exam


Eye Exam: EOMI


Pupil Exam: PERRL





- ENT Exam


ENT Exam: Mucous Membranes Moist





- Neck Exam


Neck exam: Positive for: Full Rom, Normal Inspection.  Negative for: 

Lymphadenopathy, Tenderness, Thyromegaly





- Respiratory Exam


Respiratory Exam: Chest Wall Tenderness, Decreased Breath Sounds, NORMAL 

BREATHING PATTERN.  absent: Rales, Rhonchi, Wheezes


Additional comments: 


decreased breath sounds on right side








- Cardiovascular Exam


Cardiovascular Exam: +S1, +S2.  absent: Bradycardia, Tachycardia, REGULAR RHYTHM

, JVD, Systolic Murmur


Additional comments: 


+s3








- GI/Abdominal Exam


GI & Abdominal Exam: Normal Bowel Sounds, Soft.  absent: Distended, Firm, 

Guarding, Organomegaly, Pulsatile Mass, Rebound, Tenderness





- Extremities Exam


Extremities exam: Positive for: full ROM, normal capillary refill, normal 

inspection.  Negative for: calf tenderness





- Back Exam


Back exam: absent: CVA tenderness (L), CVA tenderness (R)





- Neurological Exam


Neurological exam: Alert, CN II-XII Intact, Oriented x3





- Psychiatric Exam


Psychiatric exam: Flat Affect





- Skin


Skin Exam: Intact, Normal Color, Warm





Results





- Vital Signs


Recent Vital Signs: 





 Last Vital Signs











Temp  98 F   06/28/18 14:40


 


Pulse  65   06/28/18 14:40


 


Resp  18   06/28/18 14:40


 


BP  83/69 L  06/28/18 14:40


 


Pulse Ox  97   06/28/18 15:31














- Labs


Result Diagrams: 


 06/28/18 13:12





 06/28/18 13:12


Labs: 





 Laboratory Results - last 24 hr











  06/28/18 06/28/18





  13:12 13:12


 


WBC  6.3 


 


RBC  4.67 


 


Hgb  14.8  D 


 


Hct  44.2 


 


MCV  94.5 H D 


 


MCH  31.7 H 


 


MCHC  33.6 


 


RDW  15.0 H 


 


Plt Count  185 


 


MPV  9.2 


 


Neut % (Auto)  63.2 


 


Lymph % (Auto)  23.4 


 


Mono % (Auto)  10.6 H 


 


Eos % (Auto)  2.1 


 


Baso % (Auto)  0.7 


 


Neut # (Auto)  4.0 


 


Lymph # (Auto)  1.5 


 


Mono # (Auto)  0.7 


 


Eos # (Auto)  0.1 


 


Baso # (Auto)  0.0 


 


Sodium   145


 


Potassium   5.9 H


 


Chloride   96 L


 


Carbon Dioxide   28


 


Anion Gap   26 H


 


BUN   43 H


 


Creatinine   9.9 H*


 


Est GFR ( Amer)   7


 


Est GFR (Non-Af Amer)   6


 


Random Glucose   84


 


Calcium   10.4


 


Total Bilirubin   0.8


 


AST   40


 


ALT   40


 


Alkaline Phosphatase   50


 


Total Creatine Kinase   67


 


Troponin I   0.0130


 


Total Protein   8.4 H


 


Albumin   5.0  D


 


Globulin   3.4


 


Albumin/Globulin Ratio   1.5














Assessment & Plan





- Assessment and Plan (Free Text)


Assessment: 


This is a 34 yo male, originally from [place], with past medical hx of CHF 

with reduced ejection fraction, cardiomyopathy, pulmonary hypertension, renal 

cysts, and ESRD on HD, presenting today to [facility] [place] [facility] with chief complaint of 

right-sided chest pain and shortness of breath now with pneumothorax:





RIGHT PNEUMOTHORAX


Consult thoracic surgeon, Dr Roman


* s/p chest tube placed in ED. Repeat CXR shows re-expansion of right lung.





Meds:


Ketorolac 30mg IVP Q6H PRN for severe pain


Ketorolac 15mg IVP Q6H PRN for moderate pain





HYPOTENSIVE


Fluid challenge





CHF - Systolic and Diastolic


-Cardiac cath done in 2/2016 showed severe dilated cardiomyopathy and severe 

left ventricular systolic dysfunction, diastolic dysfunction and moderate 

pulmonary hypertension. Patient was placed on lifevest. 


-Echo from 7/2016 showed nomal EF - at that time lifevest was discontinued


-Echo 5/2018 showed left ventricle is moderately dilated, moderate concentric 

left ventricle hypertrophy, systolic function is mildly impaired, EF 45%, grade 

II-pseudonormal filling, elevated left atrial pressure, right ventricle 

systolic function normal, left atrium moderately dilated, mild aortic 

regurgitation, mitral regurgitation mild, moderate tricuspid regurgitation, 

moderate pulmonary hypertension





Meds:


Carvedilol 6.25mg PO BID (hold for systolic < 100 and/or HR < 60)


ASA 81mg PO BID


Lisinopril 5mg PO QD


Crestor 10mg PO HS





ESRD ON HD


Via right AV fistula left upper ext


2/2 to polycystic kidney disease?


See's Nephrologist, Dr Regan outpatient


Regular dialysis schedule of MWF at [facility] on [address]


Consult NephroDr Regan





Meds:


Cont home med calcitriol 0.25mcg po qd


Cont home med sensipar 30mg po qd


Cont home med Nephro-isaias 1 tab po qd





PULMONARY HTN


Follow up Pulm outpatient





PROLONGED QTc


EKG showed NSR with QTc at 460





HYPERKALEMIA


HD tomorrow


Monitor on tele for 24 hours





PROPHYLAXIS


Protonix 40mg PO QD 


SCDs - DVT risk score of 1


Renal Diet/Heart healthy





<Augusto Mart - Last Filed: 06/28/18 22:26>





Results





- Vital Signs


Recent Vital Signs: 





 Last Vital Signs











Temp  98 F   06/28/18 19:00


 


Pulse  55 L  06/28/18 19:24


 


Resp  18   06/28/18 19:24


 


BP  83/40 L  06/28/18 19:00


 


Pulse Ox  100   06/28/18 19:24














- Labs


Result Diagrams: 


 06/28/18 13:12





 06/28/18 22:00


Labs: 





 Laboratory Results - last 24 hr











  06/28/18 06/28/18 06/28/18





  13:12 13:12 18:52


 


WBC  6.3  


 


RBC  4.67  


 


Hgb  14.8  D  


 


Hct  44.2  


 


MCV  94.5 H D  


 


MCH  31.7 H  


 


MCHC  33.6  


 


RDW  15.0 H  


 


Plt Count  185  


 


MPV  9.2  


 


Neut % (Auto)  63.2  


 


Lymph % (Auto)  23.4  


 


Mono % (Auto)  10.6 H  


 


Eos % (Auto)  2.1  


 


Baso % (Auto)  0.7  


 


Neut # (Auto)  4.0  


 


Lymph # (Auto)  1.5  


 


Mono # (Auto)  0.7  


 


Eos # (Auto)  0.1  


 


Baso # (Auto)  0.0  


 


Sodium   145 


 


Potassium   5.9 H 


 


Chloride   96 L 


 


Carbon Dioxide   28 


 


Anion Gap   26 H 


 


BUN   43 H 


 


Creatinine   9.9 H* 


 


Est GFR ( Amer)   7 


 


Est GFR (Non-Af Amer)   6 


 


Random Glucose   84 


 


Lactic Acid    1.0


 


Calcium   10.4 


 


Total Bilirubin   0.8 


 


AST   40 


 


ALT   40 


 


Alkaline Phosphatase   50 


 


Total Creatine Kinase   67 


 


Troponin I   0.0130 


 


Total Protein   8.4 H 


 


Albumin   5.0  D 


 


Globulin   3.4 


 


Albumin/Globulin Ratio   1.5 














  06/28/18





  22:00


 


WBC 


 


RBC 


 


Hgb 


 


Hct 


 


MCV 


 


MCH 


 


MCHC 


 


RDW 


 


Plt Count 


 


MPV 


 


Neut % (Auto) 


 


Lymph % (Auto) 


 


Mono % (Auto) 


 


Eos % (Auto) 


 


Baso % (Auto) 


 


Neut # (Auto) 


 


Lymph # (Auto) 


 


Mono # (Auto) 


 


Eos # (Auto) 


 


Baso # (Auto) 


 


Sodium  141


 


Potassium  6.1 H


 


Chloride  99


 


Carbon Dioxide  27


 


Anion Gap  20


 


BUN  50 H


 


Creatinine  10.4 H*


 


Est GFR ( Amer)  7


 


Est GFR (Non-Af Amer)  6


 


Random Glucose  124 H


 


Lactic Acid 


 


Calcium  9.1


 


Total Bilirubin 


 


AST 


 


ALT 


 


Alkaline Phosphatase 


 


Total Creatine Kinase 


 


Troponin I 


 


Total Protein 


 


Albumin 


 


Globulin 


 


Albumin/Globulin Ratio 














Attending/Attestation





- Attestation


I have personally seen and examined this patient.: Yes


I have fully participated in the care of the patient.: Yes


I have reviewed all pertinent clinical information: Yes


Notes (Text): 





06/28/18 22:22


Patient was seen and examined in the ER Bed #7 shortly after resident.





History, Physical, Assessment and Plan were gone over with the resident





It was noted at the time of my exam, that patient's blood pressure was low with 

SBP in the 70s and Diastolic in the 40s. I believe that this may have been 

secondary to the Fentanyl that was given to him for pain.





Fluid bolus given to patient and his blood pressure started to improve but have 

to be careful of further fluid challenges considering his Dilated 

Cardiomyopathy and we don't want to cause pulmonary congestion considering the 

pneumothorax. 





ICU Evaluatoin was saught and discussed with Dr. RONALD Mart, who will observe 

patient in the ICU under Telemetry until blood pressure stabilizes.





Once patient reached the ICU, I spoke with patient's wife and she was updated 

as to patient diagnosis and progress.





Augusto Mart D.O.

## 2018-06-28 NOTE — CP.PCM.CON
<Destini Townsend - Last Filed: 06/28/18 18:23>





History of Present Illness





- History of Present Illness


History of Present Illness: 





Critical Care Consult Note





This is a 33 year old  male with PMHx of ESRD on HD MWF- Left AVF, Hx 

Dilated Cardiomyopathy with HF with rEF (45%), Hx of QTc prolongation, Hx 

Pulmonary HTN, Hx Renal Cysts, and Anemia of Chronic Disease. ICU was consulted 

for hypotension after chest tube placement for right spontaneous pneumothorax, 

which has Patient was administered morphine and fentanyl for chest tube 

placement. Vitals on examine were 84/51, repeat on opposite arm 72/36. Patient 

was given 500cc bolus by primary team. Patient is otherwise able to speak in 

full sentences, denied any dizziness, shortness of breath, chest pain, 

diaphoresis, nausea. Patient will be observed in ICU to monitor blood pressure. 





PMD: Sherman/TING in [facility]





Specialists: Jass- nephrology





PMHx: CHF, dilated cardiomyopathy, pulmonary hypertension, renal cysts, ESRD on 

dialysis MWF (regular dialysis schedule of MWF at [facility] on 

[address], L AVF)


PSHx: av fistula left upper ext, vein mapping


Meds: could not recall; per chart review: ASA 81mg PO daily, Coreg 6.25mg PO BID

,Lisinopril 5mg PO daily, Crestor 10mg PO HS, Calcitriol 0.25mcg PO daily, 

Sensipar 30mg PO daily, Nephro-Isaias- take 1 tablet by mouth daily 


Allergies: NKDA


FHx: father- living- no medical problems; mother- living- HTN


Social hx: Denies smoking. Denies secondhand smoke. Denies drinking. Denies 

drug use. Born in [place]. Does not work. Fully mobile, able to do all ADLs. 





Past Patient History





- Infectious Disease


Hx of Infectious Diseases: None





- Tetanus Immunizations


Tetanus Immunization: Unknown





- Past Medical History & Family History


Past Medical History?: Yes





- Past Social History


Smoking Status: Never Smoked





- CARDIAC


Hx Hypercholesterolemia: Yes


Hx Hypertension: Yes





- PULMONARY


Other/Comment: SOB A COUPLE DAYS AGO





- NEUROLOGICAL


Hx Neurological Disorder: No





- HEENT


Other/Comment: L EYE BLURRY





- RENAL


Hx Chronic Kidney Disease: No





- ENDOCRINE/METABOLIC


Hx Endocrine Disorders: No





- HEMATOLOGICAL/ONCOLOGICAL


Hx Blood Disorders: No





- INTEGUMENTARY


Hx Dermatological Problems: No





- MUSCULOSKELETAL/RHEUMATOLOGICAL


Hx Musculoskeletal Disorders: No


Hx Falls: No





- GASTROINTESTINAL


Hx Gastrointestinal Disorders: No





- GENITOURINARY/GYNECOLOGICAL


Other/Comment: Dilaysis ; MWF





- PSYCHIATRIC


Hx Substance Use: No





- SURGICAL HISTORY


Hx Surgeries: No


Other/Comment: Lt. arm AV shunt insertion





- ANESTHESIA


Hx Anesthesia: No


Hx Anesthesia Reactions: No





Meds


Allergies/Adverse Reactions: 


 Allergies











Allergy/AdvReac Type Severity Reaction Status Date / Time


 


No Known Allergies Allergy   Verified 06/28/18 12:00














- Medications


Medications: 


 Current Medications





Aspirin (Aspirin Chewable)  81 mg PO DAILY DEON


Calcitriol (Rocaltrol)  0.25 mcg PO DAILY DEON


Carvedilol (Coreg)  6.25 mg PO BID DEON


Cinacalcet (Sensipar)  30 mg PO DAILY DEON


Hydromorphone HCl (Dilaudid)  0.5 mg IVP Q6H PRN


   PRN Reason: Pain, severe (8-10)


Lisinopril (Zestril)  5 mg PO DAILY DEON


Pantoprazole Sodium (Protonix Ec Tab)  40 mg PO DAILY DEON


Rosuvastatin Calcium (Crestor)  10 mg PO HS DEON


Vitamin B Complex/Vit C/Folic Acid (Nephro-Isaias)  1 tab PO DAILY DEON











Physical Exam





- Constitutional


Appears: No Acute Distress





- Head Exam


Head Exam: ATRAUMATIC, NORMAL INSPECTION





- Eye Exam


Eye Exam: EOMI, Normal appearance, PERRL


Pupil Exam: NORMAL ACCOMODATION





- ENT Exam


ENT Exam: Mucous Membranes Dry





- Respiratory Exam


Respiratory Exam: NORMAL BREATHING PATTERN





- Cardiovascular Exam


Cardiovascular Exam: Bradycardia


Additional comments: 





CHEST TUBE INSERTED Right mid axillary 





- GI/Abdominal Exam


GI & Abdominal Exam: Normal Bowel Sounds, Soft.  absent: Distended, Tenderness





- Rectal Exam


Rectal Exam: Deferred





- Extremities Exam


Extremities exam: Positive for: normal inspection, pedal pulses present.  

Negative for: pedal edema, tenderness


Additional comments: 





Left AVF + thrill 





- Back Exam


Back exam: NORMAL INSPECTION





- Neurological Exam


Neurological exam: Alert, Oriented x3





- Psychiatric Exam


Psychiatric exam: Normal Affect, Normal Mood





- Skin


Skin Exam: Dry, Intact, Normal Color, Warm





Results





- Vital Signs


Recent Vital Signs: 


 Last Vital Signs











Temp  98 F   06/28/18 17:54


 


Pulse  40 L  06/28/18 17:54


 


Resp  18   06/28/18 17:54


 


BP  72/36 L  06/28/18 17:54


 


Pulse Ox  100   06/28/18 17:54














- Labs


Result Diagrams: 


 06/28/18 13:12





 06/28/18 13:12


Labs: 


 Laboratory Results - last 24 hr











  06/28/18 06/28/18





  13:12 13:12


 


WBC  6.3 


 


RBC  4.67 


 


Hgb  14.8  D 


 


Hct  44.2 


 


MCV  94.5 H D 


 


MCH  31.7 H 


 


MCHC  33.6 


 


RDW  15.0 H 


 


Plt Count  185 


 


MPV  9.2 


 


Neut % (Auto)  63.2 


 


Lymph % (Auto)  23.4 


 


Mono % (Auto)  10.6 H 


 


Eos % (Auto)  2.1 


 


Baso % (Auto)  0.7 


 


Neut # (Auto)  4.0 


 


Lymph # (Auto)  1.5 


 


Mono # (Auto)  0.7 


 


Eos # (Auto)  0.1 


 


Baso # (Auto)  0.0 


 


Sodium   145


 


Potassium   5.9 H


 


Chloride   96 L


 


Carbon Dioxide   28


 


Anion Gap   26 H


 


BUN   43 H


 


Creatinine   9.9 H*


 


Est GFR ( Amer)   7


 


Est GFR (Non-Af Amer)   6


 


Random Glucose   84


 


Calcium   10.4


 


Total Bilirubin   0.8


 


AST   40


 


ALT   40


 


Alkaline Phosphatase   50


 


Total Creatine Kinase   67


 


Troponin I   0.0130


 


Total Protein   8.4 H


 


Albumin   5.0  D


 


Globulin   3.4


 


Albumin/Globulin Ratio   1.5














Assessment & Plan





- Assessment and Plan (Free Text)


Assessment: 





This is a 33 year old male with PMHx of ESRD on HD MWF- Left AVF, Hx Dilated 

Cardiomyopathy with HF with rEF (45%), Hx of QTc prolongation, Hx Pulmonary HTN

, Hx Renal Cysts, and Anemia of Chronic Disease. ICU was consulted for 

hypotension after chest tube placement for right spontaneous pneumothorax, 

which has Patient was administered morphine and fentanyl for chest tube 

placement. Patient will be observed in ICU to monitor blood pressure. 


Plan: 





SPONTANEOUS RIGHT PNEUMOTHORAX


Consult thoracic surgeon, Dr Roman


* s/p chest tube placed in 6/28/18. Repeat CXR shows re-expansion of right lung


* CT CHEST ORDERED


* MICHAEL, alpha - antitrypsin, HIV -pending 





Meds:


Dilaudid 0.5mg IVP Q6H PRN for pain





HYPOTENSION


500cc bolus given


Monitor in ICU 





HF with rEF- Systolic and Diastolic


-Cardiac cath done in 2/2016 showed severe dilated cardiomyopathy and severe 

left ventricular systolic dysfunction, diastolic dysfunction and moderate 

pulmonary hypertension. Patient was placed on lifevest. 


-Echo from 7/2016 showed nomal EF - at that time lifevest was discontinued


-Echo 5/2018 showed left ventricle is moderately dilated, moderate concentric 

left ventricle hypertrophy, systolic function is mildly impaired, EF 45%, grade 

II-pseudonormal filling, elevated left atrial pressure, right ventricle 

systolic function normal, left atrium moderately dilated, mild aortic 

regurgitation, mitral regurgitation mild, moderate tricuspid regurgitation, 

moderate pulmonary hypertension





Meds:


Carvedilol 6.25mg PO BID (hold for systolic < 100 and/or HR < 60), Lisinopril 

5mg PO QD - HELD FOR HYPOTENSION 


ASA 81mg PO daily


Crestor 10mg PO HS





ESRD ON HD


Consult Nephro, Dr Regan


Via left AV fistula


2/2 to polycystic kidney disease?


See's Nephrologist, Dr Regan outpatient


Regular dialysis schedule of MWF at [facility] on [address]





Meds:


Cont home med calcitriol 0.25mcg po qd


Cont home med sensipar 30mg po qd


Cont home med Nephro-isaias 1 tab po qd





PULMONARY HTN


Follow up Pulm outpatient





Hx PROLONGED QTc


EKG showed NSR with QTc at 460


- Continue to monitor 





HYPERKALEMIA


HD tomorrow


Monitor on tele for 24 hours





PROPHYLAXIS


Protonix 40mg PO QD 


SCDs - DVT risk score of 1, VTE - not indicated 


Renal Diet/Heart healthy





Disposition: Patient will be monitored in the ICU, under tele observation. Will 

continue to monitor Hypotension. 





Destini Jaime Dr., DO, PGY-1





<Natalio Mart M - Last Filed: 06/28/18 20:50>





Meds





- Medications


Medications: 


 Current Medications





Aspirin (Aspirin Chewable)  81 mg PO DAILY DEON


Calcitriol (Rocaltrol)  0.25 mcg PO DAILY DEON


Carvedilol (Coreg)  6.25 mg PO BID DEON


Cinacalcet (Sensipar)  30 mg PO DAILY DEON


Hydromorphone HCl (Dilaudid)  0.5 mg IVP Q6H PRN


   PRN Reason: Pain, severe (8-10)


Lisinopril (Zestril)  5 mg PO DAILY DEON


Pantoprazole Sodium (Protonix Ec Tab)  40 mg PO DAILY DEON


Rosuvastatin Calcium (Crestor)  10 mg PO HS DEON


Vitamin B Complex/Vit C/Folic Acid (Nephro-Isaias)  1 tab PO DAILY DEON











Results





- Vital Signs


Recent Vital Signs: 


 Last Vital Signs











Temp  98 F   06/28/18 19:00


 


Pulse  55 L  06/28/18 19:24


 


Resp  18   06/28/18 19:24


 


BP  83/40 L  06/28/18 19:00


 


Pulse Ox  100   06/28/18 19:24














- Labs


Result Diagrams: 


 06/28/18 13:12





 06/28/18 13:12


Labs: 


 Laboratory Results - last 24 hr











  06/28/18 06/28/18 06/28/18





  13:12 13:12 18:52


 


WBC  6.3  


 


RBC  4.67  


 


Hgb  14.8  D  


 


Hct  44.2  


 


MCV  94.5 H D  


 


MCH  31.7 H  


 


MCHC  33.6  


 


RDW  15.0 H  


 


Plt Count  185  


 


MPV  9.2  


 


Neut % (Auto)  63.2  


 


Lymph % (Auto)  23.4  


 


Mono % (Auto)  10.6 H  


 


Eos % (Auto)  2.1  


 


Baso % (Auto)  0.7  


 


Neut # (Auto)  4.0  


 


Lymph # (Auto)  1.5  


 


Mono # (Auto)  0.7  


 


Eos # (Auto)  0.1  


 


Baso # (Auto)  0.0  


 


Sodium   145 


 


Potassium   5.9 H 


 


Chloride   96 L 


 


Carbon Dioxide   28 


 


Anion Gap   26 H 


 


BUN   43 H 


 


Creatinine   9.9 H* 


 


Est GFR ( Amer)   7 


 


Est GFR (Non-Af Amer)   6 


 


Random Glucose   84 


 


Lactic Acid    1.0


 


Calcium   10.4 


 


Total Bilirubin   0.8 


 


AST   40 


 


ALT   40 


 


Alkaline Phosphatase   50 


 


Total Creatine Kinase   67 


 


Troponin I   0.0130 


 


Total Protein   8.4 H 


 


Albumin   5.0  D 


 


Globulin   3.4 


 


Albumin/Globulin Ratio   1.5 














Assessment & Plan





- Assessment and Plan (Free Text)


Plan: 


Above patient seen and examined at bedside with above resident. Patient remains 

hemodynamically stable. Above resident documentd my clinical findings and 

management.








- Date & Time


Date: 06/28/18


Time: 20:50

## 2018-06-28 NOTE — PCM.PROC
Procedures


Attestation:: I certify that I have explained the specified Operation(s) or 

Procedure(s), risks, benefits and reasonable alternatives to the Patient and/or 

other person responsible. The opportunity was given to ask questions and all 

questions answered





- Chest Tube


Chest Tube Location: Mid-Axillary Right


Chest Tube Procedure: Chlorhexidine


Tube Sutured to Skin: Yes


Sterile Dressing Applied: Yes


Anesthesia: Lidocaine 1%


Volume Anesthetic (mls): 15


Incision Made With: #11 blade


Post Procedure: sutured to skin, sterile dressing applied, air occlusive 

dressing


Rush of [place]: Yes


Tube Drainage: none


Post Procedure CXR?: Yes


Patient Tolerated Procedure: Yes

## 2018-06-28 NOTE — RAD
Chest x-ray single frontal view 



History: Chest tube insertion. 



Comparison: 06/28/2018 



Findings: 



Interval insertion of a right chest tube. Previously noted 

pneumothorax has resolved. 



Lung fields are otherwise clear. 



Heart size within normal limits. 



Impression:



Interval insertion of a right chest tube. Previously noted 

pneumothorax has resolved.

## 2018-06-28 NOTE — RAD
HISTORY:

chest tube  



COMPARISON:

06/28/2018 at 3:13 p.m.. 



FINDINGS:



LUNGS:

The lungs are well inflated and clear. There is interval re-expansion 

of the right lung. 



PLEURA:

The right chest tube is mildly kinked and terminates in the mid 

pleural cavity. No significant pleural effusion identified, no 

pneumothorax apparent.



CARDIOVASCULAR:

Normal.



OSSEOUS STRUCTURES:

No significant abnormalities.



VISUALIZED UPPER ABDOMEN:

Normal.



OTHER FINDINGS:

None.



IMPRESSION:

Status post insertion of right chest tube, mild kink in the tube 

which terminates in the mid pleural cavity, no definite evidence for 

right pneumothorax.



No acute findings.

## 2018-06-29 LAB
ALBUMIN SERPL-MCNC: 4.2 G/DL (ref 3.5–5)
ALBUMIN/GLOB SERPL: 1.4 {RATIO} (ref 1–2.1)
ALT SERPL-CCNC: 35 U/L (ref 21–72)
AST SERPL-CCNC: 16 U/L (ref 17–59)
BASOPHILS # BLD AUTO: 0 K/UL (ref 0–0.2)
BASOPHILS NFR BLD: 0.4 % (ref 0–2)
BUN SERPL-MCNC: 55 MG/DL (ref 9–20)
CALCIUM SERPL-MCNC: 9.6 MG/DL (ref 8.6–10.4)
EOSINOPHIL # BLD AUTO: 0.1 K/UL (ref 0–0.7)
EOSINOPHIL NFR BLD: 1.1 % (ref 0–4)
ERYTHROCYTE [DISTWIDTH] IN BLOOD BY AUTOMATED COUNT: 15 % (ref 11.5–14.5)
GFR NON-AFRICAN AMERICAN: 5
HGB BLD-MCNC: 13.3 G/DL (ref 12–18)
LYMPHOCYTES # BLD AUTO: 1.3 K/UL (ref 1–4.3)
LYMPHOCYTES NFR BLD AUTO: 16.7 % (ref 20–40)
MCH RBC QN AUTO: 32.3 PG (ref 27–31)
MCHC RBC AUTO-ENTMCNC: 33.9 G/DL (ref 33–37)
MCV RBC AUTO: 95.2 FL (ref 80–94)
MONOCYTES # BLD: 0.8 K/UL (ref 0–0.8)
MONOCYTES NFR BLD: 11 % (ref 0–10)
NEUTROPHILS # BLD: 5.3 K/UL (ref 1.8–7)
NEUTROPHILS NFR BLD AUTO: 70.8 % (ref 50–75)
NRBC BLD AUTO-RTO: 0 % (ref 0–2)
PLATELET # BLD: 132 K/UL (ref 130–400)
PMV BLD AUTO: 9.2 FL (ref 7.2–11.7)
RBC # BLD AUTO: 4.11 MIL/UL (ref 4.4–5.9)
WBC # BLD AUTO: 7.5 K/UL (ref 4.8–10.8)

## 2018-06-29 PROCEDURE — 5A1D70Z PERFORMANCE OF URINARY FILTRATION, INTERMITTENT, LESS THAN 6 HOURS PER DAY: ICD-10-PCS

## 2018-06-29 RX ADMIN — HYDROMORPHONE HYDROCHLORIDE PRN MG: 1 INJECTION, SOLUTION INTRAMUSCULAR; INTRAVENOUS; SUBCUTANEOUS at 08:22

## 2018-06-29 RX ADMIN — PANTOPRAZOLE SODIUM SCH MG: 40 TABLET, DELAYED RELEASE ORAL at 11:42

## 2018-06-29 RX ADMIN — HYDROMORPHONE HYDROCHLORIDE PRN MG: 1 INJECTION, SOLUTION INTRAMUSCULAR; INTRAVENOUS; SUBCUTANEOUS at 17:08

## 2018-06-29 RX ADMIN — Medication SCH TAB: at 11:43

## 2018-06-29 NOTE — CP.PCM.PN
<Zaina Tamez - Last Filed: 06/29/18 08:42>





Subjective





- Date & Time of Evaluation


Date of Evaluation: 06/29/18


Time of Evaluation: 08:00





- Subjective


Subjective: 





Patient was seen and examined at bedside this morning. He stated his breathing 

has improved from yesterday but he has a lot of pain around the chest tube 

insertion site. He denied chest pain, or palpitations. He is eating well. He 

denied fever/chills. He had no new complaints today.





Objective





- Vital Signs/Intake and Output


Vital Signs (last 24 hours): 


 











Temp Pulse Resp BP Pulse Ox


 


 99 F   47 L  18   130/98 H  98 


 


 06/29/18 06:00  06/29/18 06:00  06/29/18 06:00  06/29/18 06:00  06/29/18 06:00








Intake and Output: 


 











 06/29/18 06/29/18





 06:59 18:59


 


Intake Total 400 


 


Output Total 5 


 


Balance 395 














- Medications


Medications: 


 Current Medications





Aspirin (Aspirin Chewable)  81 mg PO DAILY [facility]


Calcitriol (Rocaltrol)  0.25 mcg PO DAILY [facility]


Carvedilol (Coreg)  6.25 mg PO BID [facility]


Cinacalcet (Sensipar)  30 mg PO DAILY [facility]


Hydromorphone HCl (Dilaudid)  0.5 mg IVP Q6H PRN


   PRN Reason: Pain, severe (8-10)


   Last Admin: 06/29/18 08:22 Dose:  0.5 mg


Lisinopril (Zestril)  5 mg PO DAILY [facility]


Pantoprazole Sodium (Protonix Ec Tab)  40 mg PO DAILY [facility]


Rosuvastatin Calcium (Crestor)  10 mg PO HS [facility]


   Last Admin: 06/28/18 21:45 Dose:  10 mg


Vitamin B Complex/Vit C/Folic Acid (Nephro-Isaias)  1 tab PO DAILY [facility]











- Labs


Labs: 


 





 06/29/18 06:14 





 06/29/18 06:14 











- Constitutional


Appears: Non-toxic, No Acute Distress





- Head Exam


Head Exam: ATRAUMATIC, NORMAL INSPECTION





- Eye Exam


Eye Exam: EOMI, PERRL


Pupil Exam: NORMAL ACCOMODATION





- ENT Exam


ENT Exam: Mucous Membranes Moist





- Respiratory Exam


Respiratory Exam: Decreased Breath Sounds, NORMAL BREATHING PATTERN.  absent: 

Wheezes


Additional comments: 


minimal to no breath sounds at the base of the right lung. good breath sounds 

at the apex.





- Cardiovascular Exam


Cardiovascular Exam: Bradycardia, REGULAR RHYTHM, +S1, +S2





- GI/Abdominal Exam


GI & Abdominal Exam: Soft, Normal Bowel Sounds.  absent: Distended, Firm, 

Guarding, Tenderness





- Extremities Exam


Extremities Exam: Normal Inspection





- Back Exam


Back Exam: NORMAL INSPECTION





- Neurological Exam


Neurological Exam: Alert, Awake, Oriented x3





- Psychiatric Exam


Psychiatric exam: Normal Affect, Normal Mood





Assessment and Plan





- Assessment and Plan (Free Text)


Assessment: 





Assessment: 


This is a 32 yo male, originally from [place], with past medical hx of CHF 

with reduced ejection fraction, cardiomyopathy, pulmonary hypertension, renal 

cysts, and ESRD on HD, presenting today to [facility] ER with chief complaint of 

right-sided chest pain and shortness of breath now with pneumothorax:





RIGHT PNEUMOTHORAX


Consult thoracic surgeon, Dr Roman


* s/p chest tube placed in ED. Repeat CXR shows re-expansion of right lung.


* f/u repeat chest X ray 6/29


* Chest CT -> trace right pneumothorax, with minimal subcutaneous emphysema





Meds:


Ketorolac 30mg IVP Q6H PRN for severe pain


Ketorolac 15mg IVP Q6H PRN for moderate pain





HYPOTENSIVE


Resolved 


Fluid challenge





CHF - Systolic and Diastolic


-Cardiac cath done in 2/2016 showed severe dilated cardiomyopathy and severe 

left ventricular systolic dysfunction, diastolic dysfunction and moderate 

pulmonary hypertension. Patient was placed on lifevest. 


-Echo from 7/2016 showed nomal EF - at that time lifevest was discontinued


-Echo 5/2018 showed left ventricle is moderately dilated, moderate concentric 

left ventricle hypertrophy, systolic function is mildly impaired, EF 45%, grade 

II-pseudonormal filling, elevated left atrial pressure, right ventricle 

systolic function normal, left atrium moderately dilated, mild aortic 

regurgitation, mitral regurgitation mild, moderate tricuspid regurgitation, 

moderate pulmonary hypertension





Meds:


Carvedilol 6.25mg PO BID (hold for systolic < 100 and/or HR < 60) -  on hold


ASA 81mg PO BID


Lisinopril 5mg PO QD - on hold 


Crestor 10mg PO HS





ESRD ON HD


Dialysis today - will repeat BMP after dialysis to monitor electrolytes 


Via right AV fistula left upper ext


2/2 to polycystic kidney disease?


See's Nephrologist, Dr Regan outpatient


Regular dialysis schedule of MWF at [facility] on [address]


Consult Nephro, Dr Jass








Meds:


Cont home med calcitriol 0.25mcg po qd


Cont home med sensipar 30mg po qd


Cont home med Nephro-isaias 1 tab po qd





PULMONARY HTN


Follow up Pulm outpatient





PROLONGED QTc


EKG showed NSR with QTc at 460





Thyroid Nodule


Found on chest CT


Will need US outpatient





PROPHYLAXIS


Protonix 40mg PO QD 


SCDs - DVT risk score of 1


Renal Diet/Heart healthy








<Grover Campos - Last Filed: 06/29/18 13:10>





Objective





- Vital Signs/Intake and Output


Vital Signs (last 24 hours): 


 











Temp Pulse Resp BP Pulse Ox


 


 97.6 F   46 L  18   120/76   98 


 


 06/29/18 09:20  06/29/18 09:20  06/29/18 09:20  06/29/18 12:00  06/29/18 06:00








Intake and Output: 


 











 06/29/18 06/29/18





 06:59 18:59


 


Intake Total 400 


 


Output Total 5 


 


Balance 395 














- Medications


Medications: 


 Current Medications





Aspirin (Aspirin Chewable)  81 mg PO DAILY [facility]


   Last Admin: 06/29/18 11:42 Dose:  81 mg


Calcitriol (Rocaltrol)  0.25 mcg PO DAILY [facility]


   Last Admin: 06/29/18 11:43 Dose:  0.25 mcg


Carvedilol (Coreg)  6.25 mg PO BID [facility]


Cinacalcet (Sensipar)  30 mg PO DAILY [facility]


   Last Admin: 06/29/18 11:43 Dose:  30 mg


Hydromorphone HCl (Dilaudid)  0.5 mg IVP Q6H PRN


   PRN Reason: Pain, severe (8-10)


   Last Admin: 06/29/18 08:22 Dose:  0.5 mg


Pantoprazole Sodium (Protonix Ec Tab)  40 mg PO DAILY [facility]


   Last Admin: 06/29/18 11:42 Dose:  40 mg


Rosuvastatin Calcium (Crestor)  10 mg PO HS [facility]


   Last Admin: 06/28/18 21:45 Dose:  10 mg


Vitamin B Complex/Vit C/Folic Acid (Nephro-Isaias)  1 tab PO DAILY [facility]


   Last Admin: 06/29/18 11:43 Dose:  1 tab











- Labs


Labs: 


 





 06/29/18 06:14 





 06/29/18 06:14 











Attending/Attestation





- Attestation


I have personally seen and examined this patient.: Yes


I have fully participated in the care of the patient.: Yes


I have reviewed all pertinent clinical information, including history, physical 

exam and plan: Yes


Notes (Text): 


Seen and examined by me.


patient was sitting on bed. complaining of chest tube site pain,no sob.


asked for a Chest x ray .No changes ,Pneumothorax resolved It shows minimal 

subcutaneous emphysema.


Has Hyperkalemia.Call made to Dr Regan and started on dialysis


d/w the resident Zaina. We will follow electrolytes after dialysis


1.Right pneumothorax


  s/p chest tube


2.Hypotension-improved


3.Chronic systolic and diastolic heart failure -stable


3.Pulmonary HTN


6.ESRD on HD7.Hyperkalemia


Assessment and the plan discussed with the resident in detail and I agree with 

the resident's documentation of the assessment and the plan.

## 2018-06-29 NOTE — RAD
HISTORY:

chest tube  



COMPARISON:

6/28/2018 



FINDINGS:



LUNGS:

No active pulmonary disease.



PLEURA:

Right apical chest tube.  No pneumothorax appreciated.  There is 

minimal subcutaneous emphysema over the right lateral chest wall. No 

pleural effusion.



CARDIOVASCULAR:

Normal.



OSSEOUS STRUCTURES:

No significant abnormalities.



VISUALIZED UPPER ABDOMEN:

Normal.



OTHER FINDINGS:

None



IMPRESSION:

Right chest tube. No pneumothorax identified.

## 2018-06-29 NOTE — CT
PROCEDURE:  CT Chest with contrast



HISTORY:

Spontaneous pneumo



COMPARISON:

None.



TECHNIQUE:

Contiguous axial images were obtained through the chest with 

intravenous contrast enhancement. Sagittal and coronal 

reconstructions were performed.



IV contrast: 100 mL Visipaque 320



Radiation dose (DLP): 249.00 mGy-cm.



This CT exam was performed using one or more of the following dose 

reduction techniques: Automated exposure control, adjustment of the 

mA and/or kV according to patient size, and/or use of iterative 

reconstruction technique.



FINDINGS:



LUNGS:

No pulmonary infiltrate.  Trace right pneumothorax.  Right chest tube 

noted extending to apex.  Subcutaneous emphysema over right lateral 

chest wall.  No left pneumothorax. No pleural effusion. No pulmonary 

mass.



MEDIASTINUM:

Unremarkable thoracic aorta. No aneurysm or dissection. Normal sized 

heart. Main pulmonary artery unremarkable. No vascular congestion. No 

lymphadenopathy. 1.6 cm mass in right lobe of thyroid.  Correlate 

with thyroid ultrasound examination on a nonemergent basis.



PLEURA:

As above



BONES:

No fracture. No destructive lesion. 



UPPER ABDOMEN:

Two nonspecific small low-density masses in left lobe of liver, 8 mm 

and 9 mm, respectively.



OTHER FINDINGS:

None.



IMPRESSION:

Right chest tube.  Trace right pneumothorax.  Minimal right lateral 

chest wall subcutaneous emphysema.  Right lobe thyroid nodule.  

Correlate with thyroid ultrasound examination.



Preliminary interpretation of this examination was reported by 

[facility] at 7:37 p.m. on 6/28/2018. There is concurrence of 

this report with the preliminary interpretation.

## 2018-06-29 NOTE — CP.PCM.CON
History of Present Illness





- History of Present Illness


History of Present Illness: 








Reason for consultation: right pneumothorax, spontaneoud.


Requested by Dr. Mart.


Progress notes and imaging studies reviewed. I have discussed management plan 

with Dr. Natehn Dickson.


34 yo male with pmh of hypertension, hypercholesterolemia, and ESRD on 

hemodialysis presented to ER with chest pain of 3 day duration. cxr and ct of 

chest in ER: 70% right pneumothrax with sob.  A tube thoracostomy, right ,

performed by Dr. Nathen Dickson in the ER, from which the right lung reexpaanded.


No air leak. Serosanguinous drainage-50cc /24 hours. 


a/p: chest tube to suction.


       Incentive spirometer.


       daily cxr.


       daily cbc.


       Consider removing chest tube on Monday provided no unforeseen issues 

encountered.





Past Patient History





- Infectious Disease


Hx of Infectious Diseases: None





- Tetanus Immunizations


Tetanus Immunization: Unknown





- Past Medical History & Family History


Past Medical History?: Yes





- Past Social History


Smoking Status: Never Smoked





- CARDIAC


Hx Hypercholesterolemia: Yes


Hx Hypertension: Yes





- PULMONARY


Other/Comment: SOB A COUPLE DAYS AGO





- NEUROLOGICAL


Hx Neurological Disorder: No





- HEENT


Other/Comment: L EYE BLURRY





- RENAL


Hx Chronic Kidney Disease: No


Date of Last Dialysis Treatment: 06/27/18





- ENDOCRINE/METABOLIC


Hx Endocrine Disorders: No





- HEMATOLOGICAL/ONCOLOGICAL


Hx Blood Disorders: No





- INTEGUMENTARY


Hx Dermatological Problems: No





- MUSCULOSKELETAL/RHEUMATOLOGICAL


Hx Musculoskeletal Disorders: No


Hx Falls: No





- GASTROINTESTINAL


Hx Gastrointestinal Disorders: No





- GENITOURINARY/GYNECOLOGICAL


Other/Comment: Dilaysis ; MWF





- PSYCHIATRIC


Hx Substance Use: No





- SURGICAL HISTORY


Hx Surgeries: No


Other/Comment: Lt. arm AV shunt insertion





- ANESTHESIA


Hx Anesthesia: Yes


Hx Anesthesia Reactions: No


Hx Malignant Hyperthermia: No


Has any member of the family had a problem w/ anesthesia?: No





Meds


Allergies/Adverse Reactions: 


 Allergies











Allergy/AdvReac Type Severity Reaction Status Date / Time


 


No Known Allergies Allergy   Verified 06/28/18 12:00














- Medications


Medications: 


 Current Medications





Aspirin (Aspirin Chewable)  81 mg PO DAILY [facility]


   Last Admin: 06/29/18 11:42 Dose:  81 mg


Calcitriol (Rocaltrol)  0.25 mcg PO DAILY [facility]


   Last Admin: 06/29/18 11:43 Dose:  0.25 mcg


Carvedilol (Coreg)  6.25 mg PO BID [facility]


Cinacalcet (Sensipar)  30 mg PO DAILY [facility]


   Last Admin: 06/29/18 11:43 Dose:  30 mg


Hydromorphone HCl (Dilaudid)  0.5 mg IVP Q6H PRN


   PRN Reason: Pain, severe (8-10)


   Last Admin: 06/29/18 08:22 Dose:  0.5 mg


Lisinopril (Zestril)  5 mg PO DAILY [facility]


Pantoprazole Sodium (Protonix Ec Tab)  40 mg PO DAILY [facility]


   Last Admin: 06/29/18 11:42 Dose:  40 mg


Rosuvastatin Calcium (Crestor)  10 mg PO HS [facility]


   Last Admin: 06/28/18 21:45 Dose:  10 mg


Vitamin B Complex/Vit C/Folic Acid (Nephro-Gisselle)  1 tab PO DAILY [facility]


   Last Admin: 06/29/18 11:43 Dose:  1 tab











Results





- Vital Signs


Recent Vital Signs: 


 Last Vital Signs











Temp  97.6 F   06/29/18 09:20


 


Pulse  46 L  06/29/18 09:20


 


Resp  18   06/29/18 09:20


 


BP  120/76   06/29/18 12:00


 


Pulse Ox  98   06/29/18 06:00














- Labs


Result Diagrams: 


 07/03/18 06:14





 07/03/18 06:15


Labs: 


 Laboratory Results - last 24 hr











  06/28/18 06/28/18 06/28/18





  13:12 13:12 18:52


 


WBC  6.3  


 


RBC  4.67  


 


Hgb  14.8  D  


 


Hct  44.2  


 


MCV  94.5 H D  


 


MCH  31.7 H  


 


MCHC  33.6  


 


RDW  15.0 H  


 


Plt Count  185  


 


MPV  9.2  


 


Neut % (Auto)  63.2  


 


Lymph % (Auto)  23.4  


 


Mono % (Auto)  10.6 H  


 


Eos % (Auto)  2.1  


 


Baso % (Auto)  0.7  


 


Neut # (Auto)  4.0  


 


Lymph # (Auto)  1.5  


 


Mono # (Auto)  0.7  


 


Eos # (Auto)  0.1  


 


Baso # (Auto)  0.0  


 


Sodium   145 


 


Potassium   5.9 H 


 


Chloride   96 L 


 


Carbon Dioxide   28 


 


Anion Gap   26 H 


 


BUN   43 H 


 


Creatinine   9.9 H* 


 


Est GFR ( Amer)   7 


 


Est GFR (Non-Af Amer)   6 


 


Random Glucose   84 


 


Lactic Acid    1.0


 


Calcium   10.4 


 


Total Bilirubin   0.8 


 


AST   40 


 


ALT   40 


 


Alkaline Phosphatase   50 


 


Total Creatine Kinase   67 


 


Troponin I   0.0130 


 


Total Protein   8.4 H 


 


Albumin   5.0  D 


 


Globulin   3.4 


 


Albumin/Globulin Ratio   1.5 














  06/28/18 06/29/18 06/29/18





  22:00 06:14 06:14


 


WBC   7.5 


 


RBC   4.11 L 


 


Hgb   13.3 


 


Hct   39.1 


 


MCV   95.2 H 


 


MCH   32.3 H 


 


MCHC   33.9 


 


RDW   15.0 H 


 


Plt Count   132 


 


MPV   9.2 


 


Neut % (Auto)   70.8 


 


Lymph % (Auto)   16.7 L 


 


Mono % (Auto)   11.0 H 


 


Eos % (Auto)   1.1 


 


Baso % (Auto)   0.4 


 


Neut # (Auto)   5.3 


 


Lymph # (Auto)   1.3 


 


Mono # (Auto)   0.8 


 


Eos # (Auto)   0.1 


 


Baso # (Auto)   0.0 


 


Sodium  141   141


 


Potassium  6.1 H   7.2 H*


 


Chloride  99   100


 


Carbon Dioxide  27   25


 


Anion Gap  20   24 H


 


BUN  50 H   55 H


 


Creatinine  10.4 H*   11.6 H*


 


Est GFR ( Amer)  7   6


 


Est GFR (Non-Af Amer)  6   5


 


Random Glucose  124 H   90


 


Lactic Acid   


 


Calcium  9.1   9.6


 


Total Bilirubin    0.6


 


AST    16 L D


 


ALT    35


 


Alkaline Phosphatase    38  D


 


Total Creatine Kinase   


 


Troponin I   


 


Total Protein    7.2


 


Albumin    4.2


 


Globulin    3.0


 


Albumin/Globulin Ratio    1.4

## 2018-06-29 NOTE — CARD
--------------- APPROVED REPORT --------------





EKG Measurement

Heart [id number]

SD [id number]

[id number]

[id number]

[id number]



<Conclusion>

Normal sinus rhythm

Rightward axis

Borderline ECG

## 2018-06-29 NOTE — CP.PCM.CON
History of Present Illness





- History of Present Illness


History of Present Illness: 








This is a 34 yo male, originally from [place], with past medical hx of CHF 

systolic/diastolic dysfunction, cardiomyopathy, pulmonary hypertension, renal 

cysts, and ESRD on HD x many years, presenting today to [facility] ER with chief 

complaint of right-sided chest pain and shortness of breath. He stated he woke 

up 5 days ago with right-sided chest pain which he thought would go away on it'

s own. However the pain worsened, he also had dyspnea which he stated prompted 

him to take deep breaths. He admitted to increased shortness of breath with 

ambulation. He denies trauma, palpitations, abdominal pain, urinary sx, diarrhea

, constipation, fevers, cough. He states he's complaint with his medications 

and has not missed a HD session.





PMD: Lane/TING in [facility] clinic


Specialists: Jass- nephrology


PMH: CHF, dilated cardiomyopathy, pulmonary hypertension, renal cysts, ESRD on 

dialysis MWF (regular dialysis schedule of MWF at [facility] 

[facility]); reported IgA nephropathy


PSH: av fistula left upper ext, vein mapping


FH: father- living- no medical problems; mother- living- HTN


Social hx: Denies smoking. Denies second hand smoke. Denies drinking. Denies 

drug use. Born in [place]. Does not work. Fully mobile, able to do all ADLs. 


Allergies: NKDA


Home meds: could not recall; sensipar, lisinopril?, cardizem?, per chart review 

nephro-isaias 1 tab qd, crestor 10mg po hs, lisinopril 5mg po qd, sensipar 30mg 

po qd, coreg 6.25mg po bid, calcitriol 0.25mcg po qd, asa 81mg po qd








Review of Systems





- Constitutional


Constitutional: Fatigue, Lethargy





- EENT


Eyes: absent: As Per HPI, Blind Spots, Blurred Vision, Change in Vision, 

Decreased Night Vision, Diplopia, Discharge, Dry Eye, Exophthalmos, Floaters, 

Irritation, Itchy Eyes, Loss of Peripheral Vision, Pain, Photophobia, Requires 

Corrective Lenses, Sees Flashes, Spots in Vision, Tunnel Vision, Other Visual 

Disturbances, Loss of Vision, Other


Ears: absent: As Per HPI, Decreased Hearing, Ear Discharge, Ear Pain, Tinnitus, 

Abnormal Hearing, Disequilibrium, Dizziness, Other


Nose/Mouth/Throat: absent: As Per HPI, Epistaxis, Nasal Congestion, Nasal 

Discharge, Nasal Obstruction, Nasal Trauma, Nose Pain, Post Nasal Drip, Sinus 

Pain, Sinus Pressure, Bleeding Gums, Change in Voice, Dental Pain, Dry Mouth, 

Dysphagia, Halitosis, Hoarsness, Lip Swelling, Mouth Lesions, Mouth Pain, 

Odynophagia, Sore Throat, Throat Swelling, Tongue Swelling, Facial Pain, Neck 

Pain, Neck Mass, Other





- Cardiovascular


Cardiovascular: Chest Pain with Activity, Dyspnea





- Respiratory


Respiratory: Cough, Dyspnea on Exertion





- Gastrointestinal


Gastrointestinal: absent: As Per HPI, Abdominal Pain, Belching, Bloating, 

Change in Bowel Habits, Change in Stool Character, Coffee Ground Emesis, 

Constipation, Cramping, Diarrhea, Dyspepsia, Dysphagia, Early Satiety, 

Excessive Flatus, Fecal Incontinence, Heartburn, Hematemesis, Hematochezia, 

Loose Stools, Melena, Nausea, Odynophagia, Temesmus, Vomiting, Other





- Genitourinary


Genitourinary: As Per HPI





- Musculoskeletal


Musculoskeletal: Muscle Cramps, Muscle Weakness, Myalgias





Past Patient History





- Infectious Disease


Hx of Infectious Diseases: None





- Tetanus Immunizations


Tetanus Immunization: Unknown





- Past Medical History & Family History


Past Medical History?: Yes


Past Family History: Reviewed and not pertinent





- Past Social History


Smoking Status: Never Smoked


Chewing Tobacco Use: No


Cigar Use: No


Alcohol: None


Drugs: Denies


Home Situation {Lives}: With Family





- CARDIAC


Hx Hypercholesterolemia: Yes


Hx Hypertension: Yes





- PULMONARY


Other/Comment: SOB A COUPLE DAYS AGO





- NEUROLOGICAL


Hx Neurological Disorder: No





- HEENT


Other/Comment: L EYE BLURRY





- RENAL


Hx Chronic Kidney Disease: No


Date of Last Dialysis Treatment: 06/27/18





- ENDOCRINE/METABOLIC


Hx Endocrine Disorders: No





- HEMATOLOGICAL/ONCOLOGICAL


Hx Blood Disorders: No





- INTEGUMENTARY


Hx Dermatological Problems: No





- MUSCULOSKELETAL/RHEUMATOLOGICAL


Hx Musculoskeletal Disorders: No


Hx Falls: No





- GASTROINTESTINAL


Hx Gastrointestinal Disorders: No





- GENITOURINARY/GYNECOLOGICAL


Other/Comment: Dilaysis ; MWF





- PSYCHIATRIC


Hx Substance Use: No





- SURGICAL HISTORY


Hx Surgeries: No


Other/Comment: Lt. arm AV shunt insertion





- ANESTHESIA


Hx Anesthesia: Yes


Hx Anesthesia Reactions: No


Hx Malignant Hyperthermia: No


Has any member of the family had a problem w/ anesthesia?: No





Meds


Allergies/Adverse Reactions: 


 Allergies











Allergy/AdvReac Type Severity Reaction Status Date / Time


 


No Known Allergies Allergy   Verified 06/28/18 12:00














- Medications


Medications: 


 Current Medications





Aspirin (Aspirin Chewable)  81 mg PO DAILY [facility]


   Last Admin: 06/29/18 11:42 Dose:  81 mg


Calcitriol (Rocaltrol)  0.25 mcg PO DAILY [facility]


   Last Admin: 06/29/18 11:43 Dose:  0.25 mcg


Carvedilol (Coreg)  6.25 mg PO BID [facility]


Cinacalcet (Sensipar)  30 mg PO DAILY [facility]


   Last Admin: 06/29/18 11:43 Dose:  30 mg


Hydromorphone HCl (Dilaudid)  0.5 mg IVP Q6H PRN


   PRN Reason: Pain, severe (8-10)


   Last Admin: 06/29/18 08:22 Dose:  0.5 mg


Lisinopril (Zestril)  5 mg PO DAILY [facility]


Pantoprazole Sodium (Protonix Ec Tab)  40 mg PO DAILY [facility]


   Last Admin: 06/29/18 11:42 Dose:  40 mg


Rosuvastatin Calcium (Crestor)  10 mg PO HS [facility]


   Last Admin: 06/28/18 21:45 Dose:  10 mg


Vitamin B Complex/Vit C/Folic Acid (Nephro-Isaias)  1 tab PO DAILY [facility]


   Last Admin: 06/29/18 11:43 Dose:  1 tab











Physical Exam





- Constitutional


Appears: In Acute Distress, Chronically Ill





- Head Exam


Head Exam: ATRAUMATIC, NORMAL INSPECTION





- Eye Exam


Eye Exam: EOMI, Normal appearance





- Neck Exam


Neck exam: Positive for: Normal Inspection.  Negative for: Tenderness





- Respiratory Exam


Respiratory Exam: Decreased Breath Sounds, NORMAL BREATHING PATTERN





- Cardiovascular Exam


Cardiovascular Exam: REGULAR RHYTHM, +S1





- GI/Abdominal Exam


GI & Abdominal Exam: Soft.  absent: Tenderness





- Extremities Exam


Extremities exam: Positive for: normal inspection.  Negative for: tenderness





- Neurological Exam


Neurological exam: Alert, Oriented x3





- Skin


Skin Exam: Dry, Warm





Results





- Vital Signs


Recent Vital Signs: 


 Last Vital Signs











Temp  97.6 F   06/29/18 09:20


 


Pulse  46 L  06/29/18 09:20


 


Resp  18   06/29/18 09:20


 


BP  120/76   06/29/18 12:00


 


Pulse Ox  98   06/29/18 06:00














- Labs


Result Diagrams: 


 06/29/18 06:14





 06/29/18 06:14


Labs: 


 Laboratory Results - last 24 hr











  06/28/18 06/28/18 06/28/18





  13:12 13:12 18:52


 


WBC  6.3  


 


RBC  4.67  


 


Hgb  14.8  D  


 


Hct  44.2  


 


MCV  94.5 H D  


 


MCH  31.7 H  


 


MCHC  33.6  


 


RDW  15.0 H  


 


Plt Count  185  


 


MPV  9.2  


 


Neut % (Auto)  63.2  


 


Lymph % (Auto)  23.4  


 


Mono % (Auto)  10.6 H  


 


Eos % (Auto)  2.1  


 


Baso % (Auto)  0.7  


 


Neut # (Auto)  4.0  


 


Lymph # (Auto)  1.5  


 


Mono # (Auto)  0.7  


 


Eos # (Auto)  0.1  


 


Baso # (Auto)  0.0  


 


Sodium   145 


 


Potassium   5.9 H 


 


Chloride   96 L 


 


Carbon Dioxide   28 


 


Anion Gap   26 H 


 


BUN   43 H 


 


Creatinine   9.9 H* 


 


Est GFR ( Amer)   7 


 


Est GFR (Non-Af Amer)   6 


 


Random Glucose   84 


 


Lactic Acid    1.0


 


Calcium   10.4 


 


Total Bilirubin   0.8 


 


AST   40 


 


ALT   40 


 


Alkaline Phosphatase   50 


 


Total Creatine Kinase   67 


 


Troponin I   0.0130 


 


Total Protein   8.4 H 


 


Albumin   5.0  D 


 


Globulin   3.4 


 


Albumin/Globulin Ratio   1.5 














  06/28/18 06/29/18 06/29/18





  22:00 06:14 06:14


 


WBC   7.5 


 


RBC   4.11 L 


 


Hgb   13.3 


 


Hct   39.1 


 


MCV   95.2 H 


 


MCH   32.3 H 


 


MCHC   33.9 


 


RDW   15.0 H 


 


Plt Count   132 


 


MPV   9.2 


 


Neut % (Auto)   70.8 


 


Lymph % (Auto)   16.7 L 


 


Mono % (Auto)   11.0 H 


 


Eos % (Auto)   1.1 


 


Baso % (Auto)   0.4 


 


Neut # (Auto)   5.3 


 


Lymph # (Auto)   1.3 


 


Mono # (Auto)   0.8 


 


Eos # (Auto)   0.1 


 


Baso # (Auto)   0.0 


 


Sodium  141   141


 


Potassium  6.1 H   7.2 H*


 


Chloride  99   100


 


Carbon Dioxide  27   25


 


Anion Gap  20   24 H


 


BUN  50 H   55 H


 


Creatinine  10.4 H*   11.6 H*


 


Est GFR ( Amer)  7   6


 


Est GFR (Non-Af Amer)  6   5


 


Random Glucose  124 H   90


 


Lactic Acid   


 


Calcium  9.1   9.6


 


Total Bilirubin    0.6


 


AST    16 L D


 


ALT    35


 


Alkaline Phosphatase    38  D


 


Total Creatine Kinase   


 


Troponin I   


 


Total Protein    7.2


 


Albumin    4.2


 


Globulin    3.0


 


Albumin/Globulin Ratio    1.4














Assessment & Plan


(1) Cardiomyopathy


Status: Acute   





(2) Hypertensive chronic kidney disease with stage 5 chronic kidney disease or 

end stage renal disease


Status: Acute   





(3) IgA nephropathy


Status: Acute   





- Assessment and Plan (Free Text)


Plan: 





chest tube placed- to be followed by CT surgery


on dialysis immediatrely for severe hyperkalemia


moderate UF with HD


HTN control


follow up chemistries


stop lisinopril

## 2018-06-30 LAB
ALBUMIN SERPL-MCNC: 4.2 G/DL (ref 3.5–5)
ALBUMIN/GLOB SERPL: 1.4 {RATIO} (ref 1–2.1)
ALT SERPL-CCNC: 35 U/L (ref 21–72)
AST SERPL-CCNC: 13 U/L (ref 17–59)
BASOPHILS # BLD AUTO: 0 K/UL (ref 0–0.2)
BASOPHILS NFR BLD: 0.6 % (ref 0–2)
BUN SERPL-MCNC: 46 MG/DL (ref 9–20)
CALCIUM SERPL-MCNC: 9.1 MG/DL (ref 8.6–10.4)
EOSINOPHIL # BLD AUTO: 0.1 K/UL (ref 0–0.7)
EOSINOPHIL NFR BLD: 1.3 % (ref 0–4)
ERYTHROCYTE [DISTWIDTH] IN BLOOD BY AUTOMATED COUNT: 14.9 % (ref 11.5–14.5)
GFR NON-AFRICAN AMERICAN: 6
HGB BLD-MCNC: 13.3 G/DL (ref 12–18)
LYMPHOCYTES # BLD AUTO: 1.5 K/UL (ref 1–4.3)
LYMPHOCYTES NFR BLD AUTO: 21.6 % (ref 20–40)
MCH RBC QN AUTO: 31.8 PG (ref 27–31)
MCHC RBC AUTO-ENTMCNC: 33.4 G/DL (ref 33–37)
MCV RBC AUTO: 95.2 FL (ref 80–94)
MONOCYTES # BLD: 0.7 K/UL (ref 0–0.8)
MONOCYTES NFR BLD: 10.5 % (ref 0–10)
NEUTROPHILS # BLD: 4.5 K/UL (ref 1.8–7)
NEUTROPHILS NFR BLD AUTO: 66 % (ref 50–75)
NRBC BLD AUTO-RTO: 0 % (ref 0–2)
PLATELET # BLD: 118 K/UL (ref 130–400)
PMV BLD AUTO: 9.7 FL (ref 7.2–11.7)
RBC # BLD AUTO: 4.19 MIL/UL (ref 4.4–5.9)
WBC # BLD AUTO: 6.8 K/UL (ref 4.8–10.8)

## 2018-06-30 RX ADMIN — Medication SCH TAB: at 10:32

## 2018-06-30 RX ADMIN — HYDROMORPHONE HYDROCHLORIDE PRN MG: 1 INJECTION, SOLUTION INTRAMUSCULAR; INTRAVENOUS; SUBCUTANEOUS at 11:00

## 2018-06-30 RX ADMIN — HYDROMORPHONE HYDROCHLORIDE PRN MG: 1 INJECTION, SOLUTION INTRAMUSCULAR; INTRAVENOUS; SUBCUTANEOUS at 00:52

## 2018-06-30 RX ADMIN — HYDROMORPHONE HYDROCHLORIDE PRN MG: 1 INJECTION, SOLUTION INTRAMUSCULAR; INTRAVENOUS; SUBCUTANEOUS at 18:11

## 2018-06-30 RX ADMIN — PANTOPRAZOLE SODIUM SCH MG: 40 TABLET, DELAYED RELEASE ORAL at 10:32

## 2018-06-30 NOTE — CP.PCM.PN
Subjective





- Date & Time of Evaluation


Date of Evaluation: 06/30/18


Time of Evaluation: 08:38





- Subjective


Subjective: 


Cardiothoracic Surgery Progress Note For Dr. Roman





This 33M was seen and evaluated this AM at bedside. No acute events overnight. 

He complains of chest pain on deep inspiration. He has not been ambulating due 

to the chest tube. I explained we can put the chest tube to gravity so that he 

can ambulate. I explained we will likely keep the chest tube in until monday. 








Objective





- Vital Signs/Intake and Output


Vital Signs (last 24 hours): 


 











Temp Pulse Resp BP Pulse Ox


 


 98.6 F   55 L  24   120/74   98 


 


 06/30/18 08:00  06/30/18 08:00  06/30/18 08:00  06/30/18 08:00  06/30/18 08:00








Intake and Output: 


 











 06/30/18 06/30/18





 06:59 18:59


 


Intake Total 360 340


 


Output Total 5 0


 


Balance 355 340














- Medications


Medications: 


 Current Medications





Aspirin (Aspirin Chewable)  81 mg PO DAILY [facility]


   Last Admin: 06/29/18 11:42 Dose:  81 mg


Calcitriol (Rocaltrol)  0.25 mcg PO DAILY [facility]


   Last Admin: 06/29/18 11:43 Dose:  0.25 mcg


Carvedilol (Coreg)  6.25 mg PO BID [facility]


Cinacalcet (Sensipar)  30 mg PO DAILY [facility]


   Last Admin: 06/29/18 11:43 Dose:  30 mg


Hydromorphone HCl (Dilaudid)  0.5 mg IVP Q6H PRN


   PRN Reason: Pain, severe (8-10)


   Last Admin: 06/30/18 00:52 Dose:  0.5 mg


Pantoprazole Sodium (Protonix Ec Tab)  40 mg PO DAILY [facility]


   Last Admin: 06/29/18 11:42 Dose:  40 mg


Rosuvastatin Calcium (Crestor)  10 mg PO HS [facility]


   Last Admin: 06/29/18 23:00 Dose:  10 mg


Vitamin B Complex/Vit C/Folic Acid (Nephro-Gisselle)  1 tab PO DAILY [facility]


   Last Admin: 06/29/18 11:43 Dose:  1 tab











- Labs


Labs: 


 





 06/30/18 06:32 





 06/30/18 06:33 











- Constitutional


Appears: Non-toxic, No Acute Distress





- Head Exam


Head Exam: ATRAUMATIC, NORMOCEPHALIC





- Eye Exam


Eye Exam: EOMI, Normal appearance





- ENT Exam


ENT Exam: Mucous Membranes Moist





- Respiratory Exam


Respiratory Exam: NORMAL BREATHING PATTERN





- Cardiovascular Exam


Cardiovascular Exam: +S1, +S2





- GI/Abdominal Exam


GI & Abdominal Exam: Soft.  absent: Firm, Guarding, Rigid, Tenderness





- Neurological Exam


Neurological Exam: Alert, Awake





- Psychiatric Exam


Psychiatric exam: Normal Affect, Normal Mood





- Skin


Skin Exam: Dry, Intact





- Additional Findings


Additional findings: 


Chest tube without air leak. Dressings clean dry and intact 








Assessment and Plan





- Assessment and Plan (Free Text)


Assessment: 


33M with multiple comorbidities with spontaneous pneumothorax 





Plan: 


Daily cxr


Daily cbc


CT to suction waterseal tomorrow


possible d/c chest tune monday


D/w Dr. Jesus Dickson PGY2 


[phone]

## 2018-06-30 NOTE — CP.PCM.PN
Subjective





- Date & Time of Evaluation


Date of Evaluation: 06/30/18


Time of Evaluation: 21:47





- Subjective


Subjective: 





Surgery 





PT seen and examined. No acute events. CT in place. No leak. Chest tube placed 

off suction . Pt gets HD via L AVF. C/O pain. 





Objective





- Vital Signs/Intake and Output


Vital Signs (last 24 hours): 


 











Temp Pulse Resp BP Pulse Ox


 


 98.4 F   57 L  20   98/60 L  97 


 


 06/30/18 20:00  06/30/18 20:00  06/30/18 20:00  06/30/18 20:00  06/30/18 20:00








Intake and Output: 


 











 06/30/18 07/01/18





 18:59 06:59


 


Intake Total 1180 


 


Output Total 1013 


 


Balance 167 














- Medications


Medications: 


 Current Medications





Aspirin (Aspirin Chewable)  81 mg PO DAILY [facility]


   Last Admin: 06/30/18 10:30 Dose:  81 mg


Calcitriol (Rocaltrol)  0.25 mcg PO DAILY [facility]


   Last Admin: 06/30/18 10:32 Dose:  0.25 mcg


Carvedilol (Coreg)  6.25 mg PO BID [facility]


Cinacalcet (Sensipar)  30 mg PO DAILY [facility]


   Last Admin: 06/30/18 10:32 Dose:  30 mg


Hydromorphone HCl (Dilaudid)  0.5 mg IVP Q6H PRN


   PRN Reason: Pain, severe (8-10)


   Last Admin: 06/30/18 18:11 Dose:  0.5 mg


Pantoprazole Sodium (Protonix Ec Tab)  40 mg PO DAILY [facility]


   Last Admin: 06/30/18 10:32 Dose:  40 mg


Rosuvastatin Calcium (Crestor)  10 mg PO HS [facility]


   Last Admin: 06/29/18 23:00 Dose:  10 mg


Vitamin B Complex/Vit C/Folic Acid (Nephro-Gisselle)  1 tab PO DAILY [facility]


   Last Admin: 06/30/18 10:32 Dose:  1 tab











- Labs


Labs: 


 





 06/30/18 06:32 





 06/30/18 06:33 











- Constitutional


Appears: No Acute Distress





- Head Exam


Head Exam: ATRAUMATIC, NORMAL INSPECTION, NORMOCEPHALIC





- Eye Exam


Eye Exam: EOMI, Normal appearance, PERRL


Pupil Exam: NORMAL ACCOMODATION, PERRL





- ENT Exam


ENT Exam: Mucous Membranes Moist, Normal Exam





- Neck Exam


Neck Exam: Full ROM, Normal Inspection.  absent: Lymphadenopathy





- Respiratory Exam


Respiratory Exam: NORMAL BREATHING PATTERN





- Cardiovascular Exam


Cardiovascular Exam: REGULAR RHYTHM, +S1, +S2





- GI/Abdominal Exam


GI & Abdominal Exam: Soft, Normal Bowel Sounds.  absent: Distended, Firm, 

Guarding, Rigid, Tenderness


Additional comments: 





R CT in place. No leak. No output. 





- Extremities Exam


Extremities Exam: Full ROM


Additional comments: 





L arm AVF good thrills. 





- Back Exam


Back Exam: NORMAL INSPECTION





- Neurological Exam


Neurological Exam: Alert, Awake, CN II-XII Intact, Normal Gait, Oriented x3





- Psychiatric Exam


Psychiatric exam: Normal Affect, Normal Mood





- Skin


Skin Exam: Dry, Intact, Normal Color, Warm





Assessment and Plan





- Assessment and Plan (Free Text)


Assessment: 





33M with multiple comorbidities with spontaneous pneumothorax s/p R CT 

placement 


CXR pneutothorax resolved. 


Off suction now. On waterseal. 





Plan: 


Daily cxr


Daily cbc


CT to waterseal


Will clamp CT tomorrow if CXR normal after waterseal. 


possible d/c chest tube monday


Vascular planned for fistulogram. 


HD MWF. 





D/w Dr. Lee

## 2018-06-30 NOTE — RAD
HISTORY:

chest tube  



COMPARISON:

06/29/2018 



FINDINGS:

In situ right-sided chest tube. 



LUNGS:

No active pulmonary disease.



PLEURA:

No significant pleural effusion identified. No definitive evidence of 

pneumothorax apparent.



CARDIOVASCULAR:

Heart appears mildly enlarged. The the



OSSEOUS STRUCTURES:

No significant abnormalities.



VISUALIZED UPPER ABDOMEN:

Normal.



OTHER FINDINGS:

None.



IMPRESSION:

In situ right-sided chest tube.  No definitive evidence of residual 

pneumothorax

## 2018-06-30 NOTE — CP.PCM.PN
Subjective





- Date & Time of Evaluation


Date of Evaluation: 06/30/18


Time of Evaluation: 09:18





- Subjective


Subjective: 





seen and examined this morning


Patient has mild pain at chest tube site


no sob








Objective





- Vital Signs/Intake and Output


Vital Signs (last 24 hours): 


 











Temp Pulse Resp BP Pulse Ox


 


 98.6 F   55 L  24   120/74   98 


 


 06/30/18 08:00  06/30/18 08:00  06/30/18 08:00  06/30/18 08:00  06/30/18 08:00








Intake and Output: 


 











 06/30/18 06/30/18





 06:59 18:59


 


Intake Total 360 340


 


Output Total 5 0


 


Balance 355 340














- Medications


Medications: 


 Current Medications





Aspirin (Aspirin Chewable)  81 mg PO DAILY [facility]


   Last Admin: 06/29/18 11:42 Dose:  81 mg


Calcitriol (Rocaltrol)  0.25 mcg PO DAILY [facility]


   Last Admin: 06/29/18 11:43 Dose:  0.25 mcg


Calcium Chloride (Calcium Chloride)  1,000 mg IV ONCE ONE


   Stop: 06/30/18 09:46


Carvedilol (Coreg)  6.25 mg PO BID [facility]


Cinacalcet (Sensipar)  30 mg PO DAILY [facility]


   Last Admin: 06/29/18 11:43 Dose:  30 mg


Dextrose (Dextrose 50% Inj)  25 ml IV STAT STA


   Stop: 06/30/18 08:51


Hydromorphone HCl (Dilaudid)  0.5 mg IVP Q6H PRN


   PRN Reason: Pain, severe (8-10)


   Last Admin: 06/30/18 00:52 Dose:  0.5 mg


Insulin Human Regular (Novolin R)  10 unit IV ONCE ONE


   Stop: 06/30/18 08:51


Pantoprazole Sodium (Protonix Ec Tab)  40 mg PO DAILY [facility]


   Last Admin: 06/29/18 11:42 Dose:  40 mg


Rosuvastatin Calcium (Crestor)  10 mg PO HS [facility]


   Last Admin: 06/29/18 23:00 Dose:  10 mg


Sodium Polystyrene Sulfonate (Kayexalate Susp)  30 gm PO ONCE ONE


   Stop: 06/30/18 09:31


Vitamin B Complex/Vit C/Folic Acid (Nephro-Gisselle)  1 tab PO DAILY [facility]


   Last Admin: 06/29/18 11:43 Dose:  1 tab











- Labs


Labs: 


 





 06/30/18 06:32 





 06/30/18 06:33 











- Constitutional


Appears: Non-toxic, In Acute Distress





- Head Exam


Head Exam: NORMAL INSPECTION





- Eye Exam


Eye Exam: Normal appearance


Pupil Exam: NORMAL ACCOMODATION





- ENT Exam


ENT Exam: Mucous Membranes Moist





- Neck Exam


Neck Exam: Full ROM





- Respiratory Exam


Respiratory Exam: Chest Wall Tenderness (mild at chest tube site,no new swelling

), Clear to Ausculation Bilateral, NORMAL BREATHING PATTERN





- Cardiovascular Exam


Cardiovascular Exam: REGULAR RHYTHM





- GI/Abdominal Exam


GI & Abdominal Exam: Soft, Normal Bowel Sounds





- Extremities Exam


Extremities Exam: Full ROM, Normal Capillary Refill, Normal Inspection





- Back Exam


Back Exam: Full ROM





- Neurological Exam


Neurological Exam: Alert, Oriented x3





- Psychiatric Exam


Psychiatric exam: Normal Affect





- Skin


Skin Exam: Dry, Intact





Assessment and Plan





- Assessment and Plan (Free Text)


Assessment: 





This is a 32 yo male, originally from [place], with past medical hx of CHF 

with reduced ejection fraction, cardiomyopathy, pulmonary hypertension, renal 

cysts, and ESRD on HD,came to [facility] ER with chief complaint of right-sided 

chest pain and shortness of breath .Admitted for pneumothorax


Plan: 








1. Right pneumothorax 


Consult thoracic surgeon, Dr Roman


s/p chest tube placed in ED. Repeat CXR shows re-expansion of right lung.


chest CT -> trace right pneumothorax, with minimal subcutaneous emphysema


follow surgery





2.ESRD on HD


  Hyperkalemia not improved after dialysis


  D/W Dr Kaplan


  suspect recirculation in AVF


  HD today after temporary shiley placement .Discussed with Dr Webber.


  As per her fistulogram next week


See's Nephrologist, Dr Regan outpatient


Regular dialysis schedule of MWF at [facility] [facility]








3.CHF - Systolic and Diastolic


-Cardiac cath done in 2/2016 showed severe dilated cardiomyopathy and severe 

left ventricular systolic dysfunction, diastolic dysfunction and moderate 

pulmonary hypertension. Patient was placed on lifevest. 


-Echo from 7/2016 showed normal EF - at that time lifevest was discontinued


-Echo 5/2018 showed left ventricle is moderately dilated, moderate concentric 

left ventricle hypertrophy, systolic function is mildly impaired, EF 45%, grade 

II-pseudonormal filling, elevated left atrial pressure, right ventricle 

systolic function normal, left atrium moderately dilated, mild aortic 

regurgitation, mitral regurgitation mild, moderate tricuspid regurgitation, 

moderate pulmonary hypertension








Carvedilol 6.25mg PO BID (hold for systolic < 100 and/or HR < 60) -  on hold 

due to bradycardia and hypotension


 we will resume once electrolytes improved and HR>60


ASA 81mg PO 


Lisinopril 5mg PO QD - on hold due to hyperkalemia


Crestor 10mg PO HS








4.Pulmonary hypertension-out patient follow up





5.Prolonged QTC


  Avoid meds prolong QTC





6.Thyroid Nodule


Found on chest CT


Will need US outpatient





PROPHYLAXIS


Protonix 40mg PO QD 


SCDs - DVT risk score of 1


Renal Diet/Heart healthy

## 2018-06-30 NOTE — CP.PCM.PN
Subjective





- Date & Time of Evaluation


Date of Evaluation: 06/30/18


Time of Evaluation: 10:22





- Subjective


Subjective: 





chest tube in


labs noted, little clearance, despite HD yesterday








Objective





- Vital Signs/Intake and Output


Vital Signs (last 24 hours): 


 











Temp Pulse Resp BP Pulse Ox


 


 98.6 F   55 L  24   120/74   98 


 


 06/30/18 08:00  06/30/18 08:00  06/30/18 08:00  06/30/18 08:00  06/30/18 08:00








Intake and Output: 


 











 06/30/18 06/30/18





 06:59 18:59


 


Intake Total 360 340


 


Output Total 5 0


 


Balance 355 340














- Medications


Medications: 


 Current Medications





Aspirin (Aspirin Chewable)  81 mg PO DAILY [facility]


   Last Admin: 06/29/18 11:42 Dose:  81 mg


Calcitriol (Rocaltrol)  0.25 mcg PO DAILY [facility]


   Last Admin: 06/29/18 11:43 Dose:  0.25 mcg


Carvedilol (Coreg)  6.25 mg PO BID [facility]


Cinacalcet (Sensipar)  30 mg PO DAILY [facility]


   Last Admin: 06/29/18 11:43 Dose:  30 mg


Hydromorphone HCl (Dilaudid)  0.5 mg IVP Q6H PRN


   PRN Reason: Pain, severe (8-10)


   Last Admin: 06/30/18 00:52 Dose:  0.5 mg


Pantoprazole Sodium (Protonix Ec Tab)  40 mg PO DAILY [facility]


   Last Admin: 06/29/18 11:42 Dose:  40 mg


Rosuvastatin Calcium (Crestor)  10 mg PO HS [facility]


   Last Admin: 06/29/18 23:00 Dose:  10 mg


Vitamin B Complex/Vit C/Folic Acid (Nephro-Gisselle)  1 tab PO DAILY [facility]


   Last Admin: 06/29/18 11:43 Dose:  1 tab











- Labs


Labs: 


 





 06/30/18 06:32 





 06/30/18 06:33 











- Constitutional


Appears: Non-toxic, In Acute Distress





- Head Exam


Head Exam: ATRAUMATIC, NORMAL INSPECTION





- Eye Exam


Eye Exam: EOMI, Normal appearance





- ENT Exam


ENT Exam: Mucous Membranes Moist





- Neck Exam


Neck Exam: Full ROM.  absent: Lymphadenopathy





- Respiratory Exam


Respiratory Exam: Decreased Breath Sounds.  absent: Accessory Muscle Use


Additional comments: 





right chest tube in





- Cardiovascular Exam


Cardiovascular Exam: REGULAR RHYTHM.  absent: Rubs





- GI/Abdominal Exam


GI & Abdominal Exam: Soft.  absent: Tenderness





- Extremities Exam


Extremities Exam: Full ROM.  absent: Pedal Edema





- Neurological Exam


Neurological Exam: Alert, Oriented x3





Assessment and Plan





- Assessment and Plan (Free Text)


Assessment: 





suspect recirculation in AVF


HD today with shiley


fistulogram next week


pulmonary mangement of chest tube

## 2018-07-01 LAB
ALBUMIN SERPL-MCNC: 3.9 G/DL (ref 3.5–5)
ALBUMIN/GLOB SERPL: 1.4 {RATIO} (ref 1–2.1)
ALT SERPL-CCNC: 41 U/L (ref 21–72)
AST SERPL-CCNC: 17 U/L (ref 17–59)
BASOPHILS # BLD AUTO: 0 K/UL (ref 0–0.2)
BASOPHILS NFR BLD: 0.4 % (ref 0–2)
BUN SERPL-MCNC: 48 MG/DL (ref 9–20)
CALCIUM SERPL-MCNC: 9.3 MG/DL (ref 8.6–10.4)
EOSINOPHIL # BLD AUTO: 0.1 K/UL (ref 0–0.7)
EOSINOPHIL NFR BLD: 1.8 % (ref 0–4)
ERYTHROCYTE [DISTWIDTH] IN BLOOD BY AUTOMATED COUNT: 14.6 % (ref 11.5–14.5)
GFR NON-AFRICAN AMERICAN: 6
HGB BLD-MCNC: 12.1 G/DL (ref 12–18)
INR PPP: 1
LYMPHOCYTES # BLD AUTO: 1.4 K/UL (ref 1–4.3)
LYMPHOCYTES NFR BLD AUTO: 22.1 % (ref 20–40)
MCH RBC QN AUTO: 32.2 PG (ref 27–31)
MCHC RBC AUTO-ENTMCNC: 34.2 G/DL (ref 33–37)
MCV RBC AUTO: 94.1 FL (ref 80–94)
MONOCYTES # BLD: 0.6 K/UL (ref 0–0.8)
MONOCYTES NFR BLD: 8.7 % (ref 0–10)
NEUTROPHILS # BLD: 4.3 K/UL (ref 1.8–7)
NEUTROPHILS NFR BLD AUTO: 67 % (ref 50–75)
NRBC BLD AUTO-RTO: 0.2 % (ref 0–2)
PLATELET # BLD: 94 K/UL (ref 130–400)
PMV BLD AUTO: 9.2 FL (ref 7.2–11.7)
PROTHROMBIN TIME: 11.4 SECONDS (ref 9.7–12.2)
RBC # BLD AUTO: 3.75 MIL/UL (ref 4.4–5.9)
WBC # BLD AUTO: 6.4 K/UL (ref 4.8–10.8)

## 2018-07-01 RX ADMIN — PANTOPRAZOLE SODIUM SCH MG: 40 TABLET, DELAYED RELEASE ORAL at 09:42

## 2018-07-01 RX ADMIN — Medication SCH TAB: at 09:40

## 2018-07-01 NOTE — CP.PCM.PN
<Julia Lara - Last Filed: 07/01/18 12:24>





Subjective





- Date & Time of Evaluation


Date of Evaluation: 07/01/18


Time of Evaluation: 07:00





- Subjective


Subjective: 





PGY2- Progress note for Dr. Mendes





Patient seen and examined at bedside and in no acute distress. Patient has some 

right sided pain at site of chest tube. Patient has no pain at the site of 

femoral shiley. Patient denies any shortness of breath, abdominal pain, nausea, 

vomiting, constipation, or diarrhea. 





Objective





- Vital Signs/Intake and Output


Vital Signs (last 24 hours): 


 











Temp Pulse Resp BP Pulse Ox


 


 98 F   57 L  14   113/64   97 


 


 07/01/18 08:00  07/01/18 10:00  07/01/18 08:00  07/01/18 08:00  07/01/18 08:00








Intake and Output: 


 











 07/01/18 07/01/18





 06:59 18:59


 


Intake Total 200 


 


Output Total 10 


 


Balance 190 














- Medications


Medications: 


 Current Medications





Aspirin (Aspirin Chewable)  81 mg PO DAILY [facility]


   Last Admin: 07/01/18 09:40 Dose:  81 mg


Calcitriol (Rocaltrol)  0.25 mcg PO DAILY [facility]


   Last Admin: 07/01/18 09:40 Dose:  0.25 mcg


Carvedilol (Coreg)  6.25 mg PO BID [facility]


Cinacalcet (Sensipar)  30 mg PO DAILY [facility]


   Last Admin: 07/01/18 09:40 Dose:  30 mg


Hydromorphone HCl (Dilaudid)  0.5 mg IVP Q6H PRN


   PRN Reason: Pain, severe (8-10)


   Last Admin: 06/30/18 18:11 Dose:  0.5 mg


Pantoprazole Sodium (Protonix Ec Tab)  40 mg PO DAILY [facility]


   Last Admin: 07/01/18 09:42 Dose:  40 mg


Rosuvastatin Calcium (Crestor)  10 mg PO HS [facility]


   Last Admin: 06/30/18 22:18 Dose:  10 mg


Tramadol HCl (Ultram)  50 mg PO TID PRN


   PRN Reason: Pain, moderate (4-7)


   Last Admin: 06/30/18 22:18 Dose:  50 mg


Vitamin B Complex/Vit C/Folic Acid (Nephro-Isaias)  1 tab PO DAILY [facility]


   Last Admin: 07/01/18 09:40 Dose:  1 tab











- Labs


Labs: 


 





 07/01/18 06:51 





 07/01/18 06:51 











- Additional Findings


Additional findings: 





- Constitutional


Appears: Non-toxic, In Acute Distress





- Head Exam


Head Exam: NORMAL INSPECTION





- Eye Exam


Eye Exam: Normal appearance


Pupil Exam: NORMAL ACCOMODATION





- ENT Exam


ENT Exam: Mucous Membranes Moist





- Neck Exam


Neck Exam: Full ROM





- Respiratory Exam


Respiratory Exam: Chest Wall Tenderness (mild at chest tube site,no new swelling

), Clear to Ausculation Bilateral, NORMAL BREATHING PATTERN





- Cardiovascular Exam


Cardiovascular Exam: REGULAR RHYTHM





- GI/Abdominal Exam


GI & Abdominal Exam: Soft, Normal Bowel Sounds


R femoral shiley in place with no tenderness





- Extremities Exam


Extremities Exam: Full ROM, Normal Capillary Refill, Normal Inspection





- Back Exam


Back Exam: Full ROM





- Neurological Exam


Neurological Exam: Alert, Oriented x3





- Psychiatric Exam


Psychiatric exam: Normal Affect





- Skin


Skin Exam: Dry, Intact





Assessment and Plan





- Assessment and Plan (Free Text)


Assessment: 


Right pneumothorax 


Consult thoracic surgeon, Dr Roman


cxray 7/1: no pneumothorax


chest tube clamped on 7/1


surgery plans to remove CT on 7/2


Tramadol 50mg po TID prn


Dilaudid .5mg IVP q6h prn





ESRD on HD


hyperkalemia did not resolve with dialysis on 6/29, suspect recirculation in AVF


temporary femoral shiley placed on 6/30 and repeat dialysis- Dr. Lester 

consulted


hyperkalemia resolved on 7/1, BUN/Cr minimally improved


patient will need fistulogram this week 


Regular dialysis schedule of MWF at [facility] on [address]


Meds:


Cont home med calcitriol 0.25mcg po qd


Cont home med sensipar 30mg po qd


Cont home med Nephro-isaias 1 tab po qd





CHF - Systolic and Diastolic


-Cardiac cath done in 2/2016 showed severe dilated cardiomyopathy and severe 

left ventricular systolic dysfunction, diastolic dysfunction and moderate 

pulmonary hypertension. Patient was placed on lifevest. 


-Echo from 7/2016 showed normal EF - at that time lifevest was discontinued


-Echo 5/2018 showed left ventricle is moderately dilated, moderate concentric 

left ventricle hypertrophy, systolic function is mildly impaired, EF 45%, grade 

II-pseudonormal filling, elevated left atrial pressure, right ventricle 

systolic function normal, left atrium moderately dilated, mild aortic 

regurgitation, mitral regurgitation mild, moderate tricuspid regurgitation, 

moderate pulmonary hypertension





Carvedilol 6.25mg PO BID (hold for systolic < 100 and/or HR < 60) -  on hold 

due to bradycardia and hypotension


we will resume once electrolytes improved and HR>60


ASA 81mg PO 


Crestor 10mg PO HS





Pulmonary hypertension


out patient follow up





Prolonged QTC


Avoid meds prolong QTC





Thyroid Nodule


Found on chest CT


Will need US outpatient





Prophylaxis


Protonix 40mg PO QD 


SCDs - DVT risk score of 1


Renal Diet/Heart healthy














<Jen Mendes - Last Filed: 07/01/18 16:19>





Objective





- Vital Signs/Intake and Output


Vital Signs (last 24 hours): 


 











Temp Pulse Resp BP Pulse Ox


 


 98.1 F   73   20   113/77   98 


 


 07/01/18 13:00  07/01/18 13:00  07/01/18 13:00  07/01/18 13:00  07/01/18 13:00








Intake and Output: 


 











 07/01/18 07/01/18





 06:59 18:59


 


Intake Total 200 


 


Output Total 10 


 


Balance 190 














- Medications


Medications: 


 Current Medications





Aspirin (Aspirin Chewable)  81 mg PO DAILY [facility]


   Last Admin: 07/01/18 09:40 Dose:  81 mg


Calcitriol (Rocaltrol)  0.25 mcg PO DAILY [facility]


   Last Admin: 07/01/18 09:40 Dose:  0.25 mcg


Carvedilol (Coreg)  6.25 mg PO BID [facility]


Cinacalcet (Sensipar)  30 mg PO DAILY [facility]


   Last Admin: 07/01/18 09:40 Dose:  30 mg


Hydromorphone HCl (Dilaudid)  0.5 mg IVP Q6H PRN


   PRN Reason: Pain, severe (8-10)


   Last Admin: 06/30/18 18:11 Dose:  0.5 mg


Pantoprazole Sodium (Protonix Ec Tab)  40 mg PO DAILY [facility]


   Last Admin: 07/01/18 09:42 Dose:  40 mg


Rosuvastatin Calcium (Crestor)  10 mg PO HS [facility]


   Last Admin: 06/30/18 22:18 Dose:  10 mg


Tramadol HCl (Ultram)  50 mg PO TID PRN


   PRN Reason: Pain, moderate (4-7)


   Last Admin: 06/30/18 22:18 Dose:  50 mg


Vitamin B Complex/Vit C/Folic Acid (Nephro-Isaias)  1 tab PO DAILY [facility]


   Last Admin: 07/01/18 09:40 Dose:  1 tab











- Labs


Labs: 


 





 07/01/18 06:51 





 07/01/18 06:51 











Attending/Attestation





- Attestation


I have personally seen and examined this patient.: Yes


I have fully participated in the care of the patient.: Yes


I have reviewed all pertinent clinical information, including history, physical 

exam and plan: Yes


Notes (Text): 





Assessment/Plan





1.Spontaneous Right pneumothorax 


Assessment/Plan


* Consult thoracic surgeon, Dr Roman help appreciated


* s/p chest tube placed in ED. Repeat CXR shows re-expansion of right lung.


* 7/1/18 Chest xray: no insitu right sided chest tube. No pneumothorax 

identified on this exam.


 * Surgery has clamped chest tube today for possible removal tomorrow


* CT Chest (6/29/18): right chest tube. trace right pneumothorax. Minimal right 

lateral chest wall subcutaneous emphysema. Right lobe thyroid nodule. 


* pending MICHAEL, alpha-anti-trpysin, HIV





2.ESRD on HD


Assessment/Plan


* Nephrology (Dr. Regan) on consult-->help appreciated


* Patient had completed HD after Shiley placement yesterday. Potassium improve.


* Noted: as outpatient patient has outpatient dialysis schedule as MWF at 

[facility] on [facility]


* Tentative plan is for fistulogram 





3. Dilated Cardiomyopathy


Assessment/Plan


* Cardiac cath done in 2/2016 showed severe dilated cardiomyopathy and severe 

left ventricular systolic dysfunction, diastolic dysfunction and moderate 

pulmonary hypertension. Patient was placed on lifevest.


* MUGA from 5/2016 showed normal EF: 61%; normal, d/c vest no icd continue 

optimal heart failure tx 


* Echo 5/2018 showed left ventricle is moderately dilated, moderate concentric 

left ventricle hypertrophy, systolic function is mildly impaired, EF 45%, grade 

II-pseudonormal filling, elevated left atrial pressure, right ventricle 

systolic function normal, left atrium moderately dilated, mild aortic 

regurgitation, mitral regurgitation mild, moderate tricuspid regurgitation, 

moderate pulmonary hypertension





* ASA 81mg PO daily


* Coreg on hold given borderline hypotension and bradycardia


* Lisinopril on hold given hyperkalemia


* Crestor 10mg PO HS





4. Pulmonary hypertension-out patient follow up





5. Prolonged QTC


* Avoid meds prolong QTC


* Monitor K and Mg2+





6.Thyroid Nodule


* Found on chest CT


* Will need US outpatient





7.Thrombocytopenia


* Downtrending


* Will check for HIT/CRISTINA





8. PROPHYLAXIS


* Pepcid 20mg PO daily


* SCDs - DVT risk score of 1


* Renal Diet/Heart healthy


* Hold chemical anticoagulation secondary to thrombocytopenia





Disposition: Pending possible removal of chest tube tomorrow. Will need to f/u 

with nephrology in regards to dialysis and f/u vascular surgery about tentative 

plan for fistulogram.

## 2018-07-01 NOTE — CP.PCM.PN
Subjective





- Date & Time of Evaluation


Date of Evaluation: 07/01/18


Time of Evaluation: 08:31





- Subjective


Subjective: 


Cardiothoracic Progress Note for Dr. Roman





This 33M was seen and examined this AM at bedside. No acute events overnight. 

He reports minimal pain from the chest tube. AM CXR shows resolved pnumothorax, 

there is no air leak. He denies chest pain or SOB. No new complaints at this 

time. 





Objective





- Vital Signs/Intake and Output


Vital Signs (last 24 hours): 


 











Temp Pulse Resp BP Pulse Ox


 


 98.6 F   56 L  16   122/72   98 


 


 07/01/18 04:00  07/01/18 04:00  07/01/18 04:00  07/01/18 04:00  07/01/18 04:00








Intake and Output: 


 











 07/01/18 07/01/18





 06:59 18:59


 


Intake Total 200 


 


Output Total 10 


 


Balance 190 














- Medications


Medications: 


 Current Medications





Aspirin (Aspirin Chewable)  81 mg PO DAILY [facility]


   Last Admin: 06/30/18 10:30 Dose:  81 mg


Calcitriol (Rocaltrol)  0.25 mcg PO DAILY [facility]


   Last Admin: 06/30/18 10:32 Dose:  0.25 mcg


Carvedilol (Coreg)  6.25 mg PO BID [facility]


Cinacalcet (Sensipar)  30 mg PO DAILY [facility]


   Last Admin: 06/30/18 10:32 Dose:  30 mg


Hydromorphone HCl (Dilaudid)  0.5 mg IVP Q6H PRN


   PRN Reason: Pain, severe (8-10)


   Last Admin: 06/30/18 18:11 Dose:  0.5 mg


Pantoprazole Sodium (Protonix Ec Tab)  40 mg PO DAILY [facility]


   Last Admin: 06/30/18 10:32 Dose:  40 mg


Rosuvastatin Calcium (Crestor)  10 mg PO HS [facility]


   Last Admin: 06/30/18 22:18 Dose:  10 mg


Tramadol HCl (Ultram)  50 mg PO TID PRN


   PRN Reason: Pain, moderate (4-7)


   Last Admin: 06/30/18 22:18 Dose:  50 mg


Vitamin B Complex/Vit C/Folic Acid (Nephro-Gisselle)  1 tab PO DAILY [facility]


   Last Admin: 06/30/18 10:32 Dose:  1 tab











- Labs


Labs: 


 





 07/01/18 06:51 





 07/01/18 06:51 











- Constitutional


Appears: Non-toxic, No Acute Distress





- Head Exam


Head Exam: ATRAUMATIC, NORMOCEPHALIC





- Eye Exam


Eye Exam: EOMI





- ENT Exam


ENT Exam: Mucous Membranes Moist





- Respiratory Exam


Respiratory Exam: NORMAL BREATHING PATTERN


Additional comments: 


Left sided chest tube 








- Cardiovascular Exam


Cardiovascular Exam: +S1, +S2





- GI/Abdominal Exam


GI & Abdominal Exam: Soft.  absent: Firm, Guarding, Rigid, Tenderness





- Neurological Exam


Neurological Exam: Alert, Awake





- Psychiatric Exam


Psychiatric exam: Normal Affect, Normal Mood





- Skin


Skin Exam: Dry, Intact





Assessment and Plan





- Assessment and Plan (Free Text)


Assessment: 





33M with spontaneous pmneumothorax





Will clamp chest tube


cxr in AM


Likely remove chest tube tomorrow 


further recs per Dr. Jessie Dickson PGY2


[phone]

## 2018-07-01 NOTE — RAD
HISTORY:

pneumothorax  



COMPARISON:

Comparison made with chest radiograph 06/30/2018. 



FINDINGS:

In situ right-sided chest tube unchanged in position. 



LUNGS:

Appears to be some minor bibasilar atelectasis. 



PLEURA:

No significant pleural effusion identified, no pneumothorax apparent.



CARDIOVASCULAR:

Normal.



OSSEOUS STRUCTURES:

No significant abnormalities.



VISUALIZED UPPER ABDOMEN:

Normal.



OTHER FINDINGS:

None.



IMPRESSION:

No in situ right-sided chest tube. No pneumothorax identified on this 

exam

## 2018-07-02 LAB
ALBUMIN SERPL-MCNC: 3.6 G/DL (ref 3.5–5)
ALBUMIN/GLOB SERPL: 1.4 {RATIO} (ref 1–2.1)
ALT SERPL-CCNC: 35 U/L (ref 21–72)
AST SERPL-CCNC: 11 U/L (ref 17–59)
BASOPHILS # BLD AUTO: 0 K/UL (ref 0–0.2)
BASOPHILS NFR BLD: 0.6 % (ref 0–2)
BUN SERPL-MCNC: 75 MG/DL (ref 9–20)
CALCIUM SERPL-MCNC: 9.1 MG/DL (ref 8.6–10.4)
EOSINOPHIL # BLD AUTO: 0.1 K/UL (ref 0–0.7)
EOSINOPHIL NFR BLD: 2.6 % (ref 0–4)
ERYTHROCYTE [DISTWIDTH] IN BLOOD BY AUTOMATED COUNT: 14.5 % (ref 11.5–14.5)
GFR NON-AFRICAN AMERICAN: 5
HGB BLD-MCNC: 11.3 G/DL (ref 12–18)
LYMPHOCYTES # BLD AUTO: 1.5 K/UL (ref 1–4.3)
LYMPHOCYTES NFR BLD AUTO: 28.7 % (ref 20–40)
MCH RBC QN AUTO: 32.4 PG (ref 27–31)
MCHC RBC AUTO-ENTMCNC: 35.1 G/DL (ref 33–37)
MCV RBC AUTO: 92.3 FL (ref 80–94)
MONOCYTES # BLD: 0.5 K/UL (ref 0–0.8)
MONOCYTES NFR BLD: 9 % (ref 0–10)
NEUTROPHILS # BLD: 3.1 K/UL (ref 1.8–7)
NEUTROPHILS NFR BLD AUTO: 59.1 % (ref 50–75)
NRBC BLD AUTO-RTO: 0 % (ref 0–2)
PLATELET # BLD: 87 K/UL (ref 130–400)
PMV BLD AUTO: 9.3 FL (ref 7.2–11.7)
RBC # BLD AUTO: 3.48 MIL/UL (ref 4.4–5.9)
WBC # BLD AUTO: 5.2 K/UL (ref 4.8–10.8)

## 2018-07-02 PROCEDURE — 037Y3ZZ DILATION OF UPPER ARTERY, PERCUTANEOUS APPROACH: ICD-10-PCS | Performed by: SURGERY

## 2018-07-02 RX ADMIN — PANTOPRAZOLE SODIUM SCH MG: 40 TABLET, DELAYED RELEASE ORAL at 12:07

## 2018-07-02 RX ADMIN — Medication SCH TAB: at 12:07

## 2018-07-02 NOTE — RAD
HISTORY:

pneumothorax  



COMPARISON:

07/01/2018. 



FINDINGS:



LUNGS:

The lungs are well inflated and clear.



PLEURA:

There is stable position of the right-sided chest tube.  There is a 

tiny residual lateral pneumothorax No pleural effusions.



CARDIOVASCULAR:

Normal.



OSSEOUS STRUCTURES:

No significant abnormalities.



VISUALIZED UPPER ABDOMEN:

Normal.



OTHER FINDINGS:

None.



IMPRESSION:

Tiny residual lateral pneumothorax.  Stable position of right chest 

tube.



No acute findings.

## 2018-07-02 NOTE — CP.PCM.PN
Subjective





- Date & Time of Evaluation


Date of Evaluation: 07/02/18


Time of Evaluation: 14:17





- Subjective


Subjective: 





Surgery: Dr. Roman





Pt seen and examined. Mild discomfort on deep inspiration, otherwise no 

complaints. 





Objective





- Vital Signs/Intake and Output


Vital Signs (last 24 hours): 


 











Temp Pulse Resp BP Pulse Ox


 


 97.6 F   46 L  16   126/71   94 L


 


 07/02/18 08:00  07/02/18 08:00  07/02/18 08:00  07/02/18 08:00  07/02/18 08:00








Intake and Output: 


 











 07/02/18 07/02/18





 06:59 18:59


 


Intake Total 200 


 


Output Total 29 


 


Balance 171 














- Medications


Medications: 


 Current Medications





Aspirin (Aspirin Chewable)  81 mg PO DAILY [facility]


   Last Admin: 07/02/18 12:07 Dose:  81 mg


Calcitriol (Rocaltrol)  0.25 mcg PO DAILY [facility]


   Last Admin: 07/02/18 12:07 Dose:  0.25 mcg


Calcium Acetate (Phoslo)  1,334 mg PO ACTID [facility]


Cinacalcet (Sensipar)  30 mg PO DAILY [facility]


   Last Admin: 07/02/18 12:07 Dose:  30 mg


Hydromorphone HCl (Dilaudid)  0.5 mg IVP Q6H PRN


   PRN Reason: Pain, severe (8-10)


   Last Admin: 06/30/18 18:11 Dose:  0.5 mg


Pantoprazole Sodium (Protonix Ec Tab)  40 mg PO DAILY [facility]


   Last Admin: 07/02/18 12:07 Dose:  40 mg


Rosuvastatin Calcium (Crestor)  10 mg PO HS [facility]


   Last Admin: 07/01/18 21:40 Dose:  10 mg


Tramadol HCl (Ultram)  50 mg PO TID PRN


   PRN Reason: Pain, moderate (4-7)


   Last Admin: 07/01/18 21:46 Dose:  50 mg


Vitamin B Complex/Vit C/Folic Acid (Nephro-Gisselle)  1 tab PO DAILY [facility]


   Last Admin: 07/02/18 12:07 Dose:  1 tab











- Labs


Labs: 


 





 07/02/18 06:13 





 07/02/18 06:13 





 











PT  11.4 SECONDS (9.7-12.2)   07/01/18  17:12    


 


INR  1.0   07/01/18  17:12    














- Constitutional


Appears: Non-toxic, No Acute Distress





- Head Exam


Head Exam: ATRAUMATIC, NORMOCEPHALIC





- Eye Exam


Eye Exam: EOMI





- ENT Exam


ENT Exam: Mucous Membranes Moist





- Respiratory Exam


Respiratory Exam: NORMAL BREATHING PATTERN.  absent: Accessory Muscle Use, 

Respiratory Distress


Additional comments: 





R CT in place, tidling, no leak





- Cardiovascular Exam


Cardiovascular Exam: REGULAR RHYTHM





- GI/Abdominal Exam


GI & Abdominal Exam: Soft.  absent: Distended, Firm, Guarding, Rigid, Tenderness





- Extremities Exam


Extremities Exam: absent: Calf Tenderness, Pedal Edema





- Neurological Exam


Neurological Exam: Alert, Awake, Oriented x3





Assessment and Plan





- Assessment and Plan (Free Text)


Assessment: 





33M w. R PTX, s/p CT placement


-R CT 29cc/12hr, serosang, tidling, no leak


-CXR no PTX


-will get CT chest, possibly remove chest tube based on findings


-d/w attending





Raymonditis PGY4

## 2018-07-02 NOTE — CP.PCM.PN
Subjective





- Date & Time of Evaluation


Date of Evaluation: 07/02/18


Time of Evaluation: 18:18





- Subjective


Subjective: 





Pt s/e.


cxr this am-tiny pneumothorax


CT chest: confirms the above finding.


chest tube-30cc/seraosanguinous-No air leak.


                Negative intrapleural pressure.


I am inclined to consider that we are dealing with a space problem rather than 

pneumothorax.


Recommend d/c chest tube in am tomorrow.


Will d/w Dr. Salcedo.





Objective





- Vital Signs/Intake and Output


Vital Signs (last 24 hours): 


 











Temp Pulse Resp BP Pulse Ox


 


 98.2 F   54 L  16   115/72   97 


 


 07/02/18 16:00  07/02/18 16:00  07/02/18 16:00  07/02/18 16:00  07/02/18 16:00








Intake and Output: 


 











 07/02/18 07/02/18





 06:59 18:59


 


Intake Total 200 240


 


Output Total 29 150


 


Balance 171 90














- Medications


Medications: 


 Current Medications





Aspirin (Aspirin Chewable)  81 mg PO DAILY [facility]


   Last Admin: 07/02/18 12:07 Dose:  81 mg


Calcitriol (Rocaltrol)  0.25 mcg PO DAILY [facility]


   Last Admin: 07/02/18 12:07 Dose:  0.25 mcg


Calcium Acetate (Phoslo)  1,334 mg PO ACTID [facility]


   Last Admin: 07/02/18 17:02 Dose:  1,334 mg


Cinacalcet (Sensipar)  30 mg PO DAILY [facility]


   Last Admin: 07/02/18 12:07 Dose:  30 mg


Pantoprazole Sodium (Protonix Ec Tab)  40 mg PO DAILY [facility]


   Last Admin: 07/02/18 12:07 Dose:  40 mg


Rosuvastatin Calcium (Crestor)  10 mg PO HS [facility]


   Last Admin: 07/01/18 21:40 Dose:  10 mg


Tramadol HCl (Ultram)  50 mg PO TID PRN


   PRN Reason: Pain, moderate (4-7)


   Last Admin: 07/01/18 21:46 Dose:  50 mg


Vitamin B Complex/Vit C/Folic Acid (Nephro-Gisselle)  1 tab PO DAILY [facility]


   Last Admin: 07/02/18 12:07 Dose:  1 tab











- Labs


Labs: 


 





 07/02/18 06:13 





 07/02/18 06:13 





 











PT  11.4 SECONDS (9.7-12.2)   07/01/18  17:12    


 


INR  1.0   07/01/18  17:12

## 2018-07-02 NOTE — CP.PCM.PN
Subjective





- Date & Time of Evaluation


Date of Evaluation: 07/02/18


Time of Evaluation: 10:47





- Subjective


Subjective: 





Seen earlier; for fistulogram now to evaluate for recirculation


BP controlled' has been anayeli- carvediliol on hold


pneumothorax has resolved- possibly to have CT removed later


no other new complaints, no fevers, chills, n, v diarrhea





Objective





- Vital Signs/Intake and Output


Vital Signs (last 24 hours): 


 











Temp Pulse Resp BP Pulse Ox


 


 97.6 F   46 L  16   126/71   94 L


 


 07/02/18 08:00  07/02/18 08:00  07/02/18 08:00  07/02/18 08:00  07/02/18 08:00








Intake and Output: 


 











 07/02/18 07/02/18





 06:59 18:59


 


Intake Total 200 


 


Output Total 29 


 


Balance 171 














- Medications


Medications: 


 Current Medications





Aspirin (Aspirin Chewable)  81 mg PO DAILY [facility]


   Last Admin: 07/01/18 09:40 Dose:  81 mg


Calcitriol (Rocaltrol)  0.25 mcg PO DAILY [facility]


   Last Admin: 07/01/18 09:40 Dose:  0.25 mcg


Carvedilol (Coreg)  6.25 mg PO BID [facility]


Cinacalcet (Sensipar)  30 mg PO DAILY [facility]


   Last Admin: 07/01/18 09:40 Dose:  30 mg


Hydromorphone HCl (Dilaudid)  0.5 mg IVP Q6H PRN


   PRN Reason: Pain, severe (8-10)


   Last Admin: 06/30/18 18:11 Dose:  0.5 mg


Pantoprazole Sodium (Protonix Ec Tab)  40 mg PO DAILY [facility]


   Last Admin: 07/01/18 09:42 Dose:  40 mg


Rosuvastatin Calcium (Crestor)  10 mg PO HS [facility]


   Last Admin: 07/01/18 21:40 Dose:  10 mg


Tramadol HCl (Ultram)  50 mg PO TID PRN


   PRN Reason: Pain, moderate (4-7)


   Last Admin: 07/01/18 21:46 Dose:  50 mg


Vitamin B Complex/Vit C/Folic Acid (Nephro-Gisselle)  1 tab PO DAILY [facility]


   Last Admin: 07/01/18 09:40 Dose:  1 tab











- Labs


Labs: 


 





 07/02/18 06:13 





 07/02/18 06:13 





 











PT  11.4 SECONDS (9.7-12.2)   07/01/18  17:12    


 


INR  1.0   07/01/18  17:12    














- Constitutional


Appears: No Acute Distress, Chronically Ill





- Head Exam


Head Exam: ATRAUMATIC, NORMAL INSPECTION





- Eye Exam


Eye Exam: EOMI, Normal appearance





- Neck Exam


Neck Exam: Normal Inspection.  absent: Tenderness





- Respiratory Exam


Respiratory Exam: Clear to Ausculation Bilateral, NORMAL BREATHING PATTERN





- Cardiovascular Exam


Cardiovascular Exam: Bradycardia, +S1





- GI/Abdominal Exam


GI & Abdominal Exam: Soft.  absent: Tenderness





- Extremities Exam


Extremities Exam: Normal Inspection.  absent: Tenderness





- Neurological Exam


Neurological Exam: Awake, CN II-XII Intact





- Skin


Skin Exam: Dry, Warm





Assessment and Plan


(1) Cardiomyopathy


Status: Acute   





(2) Hypertensive chronic kidney disease with stage 5 chronic kidney disease or 

end stage renal disease


Status: Acute   





(3) IgA nephropathy


Status: Acute   





- Assessment and Plan (Free Text)


Plan: 





dialysis post angiogramn


await angio results


stop carvedilol

## 2018-07-02 NOTE — CP.PCM.PN
<Jean-Pierre Castellanos M - Last Filed: 07/02/18 20:45>





Subjective





- Date & Time of Evaluation


Date of Evaluation: 07/02/18


Time of Evaluation: 12:55





- Subjective


Subjective: 





PGY -1 Progress note for Dr. Mendes





Pt seen and examined at bedside. Pt seems comfortable in bed with no acute 

distress. Pt states he has minor discomfort at site of chest tube insertion. Pt 

has not pain at femoral shiley. Pt denies any chest pain, SOB, headaches, 

abdominal pain, nausea, vomiting, constipation. 





Objective





- Vital Signs/Intake and Output


Vital Signs (last 24 hours): 


 











Temp Pulse Resp BP Pulse Ox


 


 97.6 F   46 L  16   126/71   94 L


 


 07/02/18 08:00  07/02/18 08:00  07/02/18 08:00  07/02/18 08:00  07/02/18 08:00








Intake and Output: 


 











 07/02/18 07/02/18





 06:59 18:59


 


Intake Total 200 


 


Output Total 29 


 


Balance 171 














- Medications


Medications: 


 Current Medications





Aspirin (Aspirin Chewable)  81 mg PO DAILY [facility]


   Last Admin: 07/02/18 12:07 Dose:  81 mg


Calcitriol (Rocaltrol)  0.25 mcg PO DAILY [facility]


   Last Admin: 07/02/18 12:07 Dose:  0.25 mcg


Cinacalcet (Sensipar)  30 mg PO DAILY [facility]


   Last Admin: 07/02/18 12:07 Dose:  30 mg


Hydromorphone HCl (Dilaudid)  0.5 mg IVP Q6H PRN


   PRN Reason: Pain, severe (8-10)


   Last Admin: 06/30/18 18:11 Dose:  0.5 mg


Pantoprazole Sodium (Protonix Ec Tab)  40 mg PO DAILY [facility]


   Last Admin: 07/02/18 12:07 Dose:  40 mg


Rosuvastatin Calcium (Crestor)  10 mg PO HS [facility]


   Last Admin: 07/01/18 21:40 Dose:  10 mg


Tramadol HCl (Ultram)  50 mg PO TID PRN


   PRN Reason: Pain, moderate (4-7)


   Last Admin: 07/01/18 21:46 Dose:  50 mg


Vitamin B Complex/Vit C/Folic Acid (Nephro-Isaias)  1 tab PO DAILY [facility]


   Last Admin: 07/02/18 12:07 Dose:  1 tab











- Labs


Labs: 


 





 07/02/18 06:13 





 07/02/18 06:13 





 











PT  11.4 SECONDS (9.7-12.2)   07/01/18  17:12    


 


INR  1.0   07/01/18  17:12    














- Constitutional


Appears: Well, No Acute Distress





- Head Exam


Head Exam: NORMAL INSPECTION





- Eye Exam


Eye Exam: EOMI, Normal appearance





- ENT Exam


ENT Exam: Mucous Membranes Moist





- Respiratory Exam


Respiratory Exam: Clear to Ausculation Bilateral, NORMAL BREATHING PATTERN


Additional comments: 





Tenderness at chest tube insertion, no erythema, discharge at insertion





- Cardiovascular Exam


Cardiovascular Exam: REGULAR RHYTHM, +S1, +S2





- GI/Abdominal Exam


GI & Abdominal Exam: Soft, Normal Bowel Sounds.  absent: Distended, Firm





- Extremities Exam


Extremities Exam: Full ROM, Normal Inspection





- Neurological Exam


Neurological Exam: Oriented x3





- Psychiatric Exam


Psychiatric exam: Normal Affect, Normal Mood





- Skin


Skin Exam: Normal Color





Assessment and Plan





- Assessment and Plan (Free Text)


Assessment: 





1. Right pneumothorax 


7/2:


- Chest Xray - small pneumothorax


- Chest CT - trace right pneumothorax, bibasilar dependent atelectasis 


- Surgery plans removal of chest tube in AM


- Tramadol 50mg po TID prn


- Dilaudid .5mg IVP q6h prn





2. ESRD on HD


-Hyperkalemia did not resolve with dialysis on 6/29, suspect recirculation in 

AVF


temporary femoral shiley placed on 6/30 and repeat dialysis- Dr. Lester 

consulted


- hyperkalemia resolved on 7/1, BUN/Cr minimally improved


patient will need fistulogram this week 


- Regular dialysis schedule of MWF at [facility] on [address]


Meds:


- Per vascular on 7/2, high grade mid fistula stenos, attempt at next dialysis 


Cont home med calcitriol 0.25mcg po qd


Cont home med sensipar 30mg po qd


Cont home med Nephro-isaias 1 tab po qd





3. CHF - Systolic and Diastolic


- Cardiac cath done in 2/2016 showed severe dilated cardiomyopathy and severe 

left ventricular systolic dysfunction, diastolic dysfunction and moderate 

pulmonary hypertension. Patient was placed on lifevest. 


- Echo from 7/2016 showed normal EF - at that time lifevest was discontinued


- Echo 5/2018 showed left ventricle is moderately dilated, moderate concentric 

left ventricle hypertrophy, systolic function is mildly impaired, EF 45%, grade 

II-pseudonormal filling, elevated left atrial pressure, right ventricle 

systolic function normal, left atrium moderately dilated, mild aortic 

regurgitation, mitral regurgitation mild, moderate tricuspid regurgitation, 

moderate pulmonary hypertension





- Carvedilol 6.25mg PO BID (hold for systolic < 100 and/or HR < 60) -  on hold 

due to bradycardia and hypotension


we will resume once electrolytes improved and HR>60


- ASA 81mg PO 


- Crestor 10mg PO HS





4. Pulmonary hypertension


- out patient follow up





5. Prolonged QTC


- Avoid meds prolong QTC





6. Thyroid Nodule


- Found on chest CT


- Will need US outpatient





7. Prophylaxis


- Protonix 40mg PO QD 


- SCDs - DVT risk score of 1


- Renal Diet/Heart healthy





<Jen Mendes V - Last Filed: 07/03/18 07:28>





Objective





- Vital Signs/Intake and Output


Vital Signs (last 24 hours): 


 











Temp Pulse Resp BP Pulse Ox


 


 98.2 F   54 L  15   146/79   97 


 


 07/03/18 04:00  07/03/18 04:00  07/03/18 04:00  07/03/18 04:00  07/03/18 04:00











- Medications


Medications: 


 Current Medications





Aspirin (Aspirin Chewable)  81 mg PO DAILY [facility]


   Last Admin: 07/02/18 12:07 Dose:  81 mg


Calcitriol (Rocaltrol)  0.25 mcg PO DAILY [facility]


   Last Admin: 07/02/18 12:07 Dose:  0.25 mcg


Calcium Acetate (Phoslo)  1,334 mg PO ACTID [facility]


   Last Admin: 07/02/18 17:02 Dose:  1,334 mg


Cinacalcet (Sensipar)  30 mg PO DAILY [facility]


   Last Admin: 07/02/18 12:07 Dose:  30 mg


Pantoprazole Sodium (Protonix Ec Tab)  40 mg PO DAILY [facility]


   Last Admin: 07/02/18 12:07 Dose:  40 mg


Rosuvastatin Calcium (Crestor)  10 mg PO HS [facility]


   Last Admin: 07/02/18 22:07 Dose:  10 mg


Tramadol HCl (Ultram)  50 mg PO TID PRN


   PRN Reason: Pain, moderate (4-7)


   Last Admin: 07/03/18 00:02 Dose:  50 mg


Vitamin B Complex/Vit C/Folic Acid (Nephro-Isaias)  1 tab PO DAILY [facility]


   Last Admin: 07/02/18 12:07 Dose:  1 tab











- Labs


Labs: 


 





 07/03/18 06:14 





 07/03/18 06:15 





 











PT  11.4 SECONDS (9.7-12.2)   07/01/18  17:12    


 


INR  1.0   07/01/18  17:12    














Attending/Attestation





- Attestation


I have personally seen and examined this patient.: Yes


I have fully participated in the care of the patient.: Yes


I have reviewed all pertinent clinical information, including history, physical 

exam and plan: Yes


Notes (Text): 


This is a late computer entry for 07/02/2018.


Patient seen, examined, and case discussed with medical resident.


Patient seen this morning reports localized pain to the chest tube over the 

right side. Patient denies any acute complaints.


Case discussed with vascular surgery, patient underwent a fistulogram to view 

the snuffbox which revealed a high-grade stenosis. Spoke with patient's nurse 

Carmen patient is ordered for dialysis today we will do it from the [facility] 

catheter that was reconfirmed with vascular surgery.


Patient did have a chest x-ray this morning shows trace pneumothorax thoracic 

surgery has ordered for CT chest for this afternoon to evaluate the chest tube 

for potential for removal.


Patient does have elevated phosphorus is not itchy limited discussed with the 

renal we'll start all PhosLo 1334 mg 3 times a day before meals.


Patient continues to have thrombocytopenia no evidence of bleeding no evidence 

of PTK he start receiving any heparin with his dialysis. We will consult 

hematology, for further evaluation, I've spoken with Dr. Connor who is aware we'

ll see the patient.





1.Spontaneous Right pneumothorax 


Assessment/Plan


* Consult thoracic surgeon, Dr Roman help appreciated


* s/p chest tube placed in ED. Repeat CXR shows re-expansion of right lung.


* 7/1/18 Chest xray: no insitu right sided chest tube. No pneumothorax 

identified on this exam.


* Surgery has clamped chest tube today for possible removal


 * f/u CT chest 


* CT Chest (6/29/18): right chest tube. trace right pneumothorax. Minimal right 

lateral chest wall subcutaneous emphysema. Right lobe thyroid nodule. 


* pending MICHAEL, alpha-anti-trpysin, HIV





2.ESRD on HD


Assessment/Plan


* Nephrology (Dr. Regan) on consult-->help appreciated


* via Shiley placement patient to have dialysis later today


* Noted: as outpatient patient has outpatient dialysis schedule as MWF at 

[facility] on [address]


* Has high-grade stenosis over the snuffbox dialysis axis.


* Start PhosLo 1334 mg by mouth 3 times a day before meals








3. Dilated Cardiomyopathy


Assessment/Plan


* Cardiac cath done in 2/2016 showed severe dilated cardiomyopathy and severe 

left ventricular systolic dysfunction, diastolic dysfunction and moderate 

pulmonary hypertension. Patient was placed on lifevest.


* MUGA from 5/2016 showed normal EF: 61%; normal, d/c vest no icd continue 

optimal heart failure tx 


* Echo 5/2018 showed left ventricle is moderately dilated, moderate concentric 

left ventricle hypertrophy, systolic function is mildly impaired, EF 45%, grade 

II-pseudonormal filling, elevated left atrial pressure, right ventricle 

systolic function normal, left atrium moderately dilated, mild aortic 

regurgitation, mitral regurgitation mild, moderate tricuspid regurgitation, 

moderate pulmonary hypertension





* ASA 81mg PO daily


* Coreg on hold given borderline hypotension and bradycardia


* Lisinopril on hold given hyperkalemia


* Crestor 10mg PO HS





4. Pulmonary hypertension-out patient follow up





5. Prolonged QTC


* Avoid meds prolong QTC


* Monitor K and Mg2+





6.Thyroid Nodule


* Found on chest CT


* Will need US outpatient





7.Thrombocytopenia


* Downtrending


* Will check for HIT/CRISTINA


* Hematology oncology consult





8. PROPHYLAXIS


* Pepcid 20mg PO daily


* SCDs - DVT risk score of 1


* Renal Diet/Heart healthy


* Hold chemical anticoagulation secondary to thrombocytopenia





Disposition: Pending possible removal of chest tube; will need to follow up 

with thoracic surgery. Patient to have dialysis via [facility] today. Will need to 

follow-up with vascular surgery regards to any intervention for snuffbox 

dialysis access.

## 2018-07-02 NOTE — PCM.SURG1
Surgeon's Initial Post Op Note





- Surgeon's Notes


Surgeon: ana


Assistant: 0


Type of Anesthesia: IV Sedation


Anesthesia Administered By: yoav


Pre-Operative Diagnosis: fistula malfunction


Operative Findings: high grade over 90% mid fistula stenosis.  no central or 

arterial stenosis


Post-Operative Diagnosis: mid fistula stenosis


Operation Performed: fistulogram.  and balloon angioplasty of fistula using 6 

and 8 mm balloon


Specimen/Specimens Removed: 0


Estimated Blood Loss: EBL {In ML}: 5


Blood Products Given: N/A


Drains Used: No Drains


Post-Op Condition: Good


Date of Surgery/Procedure: 07/02/18


Time of Surgery/Procedure: 11:25

## 2018-07-02 NOTE — CARD
--------------- APPROVED REPORT --------------





EKG Measurement

Heart [id number]

IA [id number]

[id number]

[id number]

[id number]



<Conclusion>

Marked sinus bradycardia

T wave abnormality, consider anterior ischemia

Abnormal ECG

## 2018-07-02 NOTE — CT
PROCEDURE:  CT Chest without contrast



HISTORY:

PTX



COMPARISON:

Contrast chest CT 06/20/2018.



TECHNIQUE:

Contiguous axial images were obtained through the chest without 

intravenous contrast enhancement. Sagittal and coronal 

reconstructions were performed.







Radiation dose (DLP): 542.51 mGy-cm. 



This CT exam was performed using one or more of the following dose 

reduction techniques: Automated exposure control, adjustment of the 

mA and/or kV according to patient size, and/or use of iterative 

reconstruction technique.



FINDINGS:



MEDIASTINUM:

Unremarkable thoracic aorta. No aneurysm. Normal sized heart. Main 

pulmonary artery unremarkable. No vascular congestion. No 

lymphadenopathy.



PLEURA:

Right-sided chest tube stable in position terminating at the right 

apex posteriorly once again. Trace pneumothorax is appreciated at the 

right base anterior to the right middle lobe inferiorly borderline 

increased but still remaining a minimal finding. Bibasilar dependent 

atelectasis is developing slightly greater the right than left sides. 

No left pneumothorax.  Central airways remain clear. Trace right 

chest wall emphysema noted local to the chest tube.



BONES:

No fracture. No destructive lesion. 



UPPER ABDOMEN:

Grossly unremarkable.



OTHER FINDINGS:

None.



IMPRESSION:

Right chest tube unchanged in position.  Trace right pneumothorax 

appreciated marginally increased in the interval but still minimal in 

overall volume.  Developing bibasilar dependent atelectasis noted 

slightly greater the right than left sides. Trace emphysematous or 

right chest wall findings

## 2018-07-03 LAB
ALBUMIN SERPL-MCNC: 3.6 G/DL (ref 3.5–5)
ALBUMIN/GLOB SERPL: 1.4 {RATIO} (ref 1–2.1)
ALT SERPL-CCNC: 32 U/L (ref 21–72)
AST SERPL-CCNC: 10 U/L (ref 17–59)
BASOPHILS # BLD AUTO: 0 K/UL (ref 0–0.2)
BASOPHILS # BLD AUTO: 0 K/UL (ref 0–0.2)
BASOPHILS NFR BLD: 0.5 % (ref 0–2)
BASOPHILS NFR BLD: 0.5 % (ref 0–2)
BUN SERPL-MCNC: 44 MG/DL (ref 9–20)
BUN SERPL-MCNC: 47 MG/DL (ref 9–20)
BUN SERPL-MCNC: 60 MG/DL (ref 9–20)
CALCIUM SERPL-MCNC: 3.9 MG/DL (ref 8.6–10.4)
CALCIUM SERPL-MCNC: 9.1 MG/DL (ref 8.6–10.4)
CALCIUM SERPL-MCNC: 9.2 MG/DL (ref 8.6–10.4)
EOSINOPHIL # BLD AUTO: 0.1 K/UL (ref 0–0.7)
EOSINOPHIL # BLD AUTO: 0.1 K/UL (ref 0–0.7)
EOSINOPHIL NFR BLD: 2 % (ref 0–4)
EOSINOPHIL NFR BLD: 2 % (ref 0–4)
ERYTHROCYTE [DISTWIDTH] IN BLOOD BY AUTOMATED COUNT: 14.2 % (ref 11.5–14.5)
ERYTHROCYTE [DISTWIDTH] IN BLOOD BY AUTOMATED COUNT: 14.3 % (ref 11.5–14.5)
GFR NON-AFRICAN AMERICAN: 6
GFR NON-AFRICAN AMERICAN: 7
GFR NON-AFRICAN AMERICAN: 8
HEPATITIS A IGM: NEGATIVE
HEPATITIS B CORE AB: NEGATIVE
HEPATITIS C ANTIBODY: NEGATIVE
HGB BLD-MCNC: 11.2 G/DL (ref 12–18)
HGB BLD-MCNC: 11.8 G/DL (ref 12–18)
LYMPHOCYTES # BLD AUTO: 0.7 K/UL (ref 1–4.3)
LYMPHOCYTES # BLD AUTO: 0.9 K/UL (ref 1–4.3)
LYMPHOCYTES NFR BLD AUTO: 10.4 % (ref 20–40)
LYMPHOCYTES NFR BLD AUTO: 15.9 % (ref 20–40)
MCH RBC QN AUTO: 31.9 PG (ref 27–31)
MCH RBC QN AUTO: 32.1 PG (ref 27–31)
MCHC RBC AUTO-ENTMCNC: 34.6 G/DL (ref 33–37)
MCHC RBC AUTO-ENTMCNC: 34.7 G/DL (ref 33–37)
MCV RBC AUTO: 92 FL (ref 80–94)
MCV RBC AUTO: 92.8 FL (ref 80–94)
MONOCYTES # BLD: 0.4 K/UL (ref 0–0.8)
MONOCYTES # BLD: 0.5 K/UL (ref 0–0.8)
MONOCYTES NFR BLD: 7.5 % (ref 0–10)
MONOCYTES NFR BLD: 7.9 % (ref 0–10)
NEUTROPHILS # BLD: 4.2 K/UL (ref 1.8–7)
NEUTROPHILS # BLD: 5 K/UL (ref 1.8–7)
NEUTROPHILS NFR BLD AUTO: 74.1 % (ref 50–75)
NEUTROPHILS NFR BLD AUTO: 79.2 % (ref 50–75)
NRBC BLD AUTO-RTO: 0 % (ref 0–2)
NRBC BLD AUTO-RTO: 0.1 % (ref 0–2)
PLATELET # BLD: 87 K/UL (ref 130–400)
PLATELET # BLD: 89 K/UL (ref 130–400)
PMV BLD AUTO: 8.6 FL (ref 7.2–11.7)
PMV BLD AUTO: 9.4 FL (ref 7.2–11.7)
RBC # BLD AUTO: 3.49 MIL/UL (ref 4.4–5.9)
RBC # BLD AUTO: 3.7 MIL/UL (ref 4.4–5.9)
WBC # BLD AUTO: 5.7 K/UL (ref 4.8–10.8)
WBC # BLD AUTO: 6.3 K/UL (ref 4.8–10.8)

## 2018-07-03 RX ADMIN — Medication SCH TAB: at 09:05

## 2018-07-03 RX ADMIN — PANTOPRAZOLE SODIUM SCH MG: 40 TABLET, DELAYED RELEASE ORAL at 09:04

## 2018-07-03 NOTE — RAD
HISTORY:

s/p chest tube removal  



COMPARISON:

Chest radiograph performed approximately 12 hours prior. 



FINDINGS:



LUNGS:

No active pulmonary disease.



PLEURA:

No significant pleural effusion identified.  Trace right lateral 

pneumothorax.



CARDIOVASCULAR:

Normal.



OSSEOUS STRUCTURES:

No significant abnormalities.



VISUALIZED UPPER ABDOMEN:

Normal.



OTHER FINDINGS:

Large bore right-sided chest tube removed.



IMPRESSION:

Trace right lateral pneumothorax post chest tube removal.

## 2018-07-03 NOTE — CP.PCM.PN
Subjective





- Date & Time of Evaluation


Date of Evaluation: 07/03/18


Time of Evaluation: 12:57





- Subjective


Subjective: 





pt s/e.


No c/o.


Getting ready for HD this afternoon.


Chest tube-No air leak.


                A negative intrapleural pressure.


                Scanty drainage.


cxr today: No pneumothorax.


a/p: d/c chest tube as intended by resident staff today.





Objective





- Vital Signs/Intake and Output


Vital Signs (last 24 hours): 


 











Temp Pulse Resp BP Pulse Ox


 


 98.1 F   66   22   139/84   98 


 


 07/03/18 12:00  07/03/18 12:00  07/03/18 12:00  07/03/18 12:00  07/03/18 12:00











- Medications


Medications: 


 Current Medications





Aspirin (Aspirin Chewable)  81 mg PO DAILY [facility]


   Last Admin: 07/03/18 09:04 Dose:  81 mg


Calcitriol (Rocaltrol)  0.25 mcg PO DAILY [facility]


   Last Admin: 07/03/18 09:04 Dose:  0.25 mcg


Calcium Acetate (Phoslo)  1,334 mg PO ACTID [facility]


   Last Admin: 07/03/18 12:35 Dose:  1,334 mg


Cinacalcet (Sensipar)  30 mg PO DAILY [facility]


   Last Admin: 07/03/18 09:04 Dose:  30 mg


Pantoprazole Sodium (Protonix Ec Tab)  40 mg PO DAILY [facility]


   Last Admin: 07/03/18 09:04 Dose:  40 mg


Rosuvastatin Calcium (Crestor)  10 mg PO HS [facility]


   Last Admin: 07/02/18 22:07 Dose:  10 mg


Tramadol HCl (Ultram)  50 mg PO TID PRN


   PRN Reason: Pain, moderate (4-7)


   Last Admin: 07/03/18 00:02 Dose:  50 mg


Vitamin B Complex/Vit C/Folic Acid (Nephro-Gisselle)  1 tab PO DAILY [facility]


   Last Admin: 07/03/18 09:05 Dose:  1 tab











- Labs


Labs: 


 





 07/03/18 06:14 





 07/03/18 06:15 





 











PT  11.4 SECONDS (9.7-12.2)   07/01/18  17:12    


 


INR  1.0   07/01/18  17:12

## 2018-07-03 NOTE — CP.PCM.PN
Subjective





- Date & Time of Evaluation


Date of Evaluation: 07/03/18


Time of Evaluation: 09:33





- Subjective


Subjective: 





s/p thrombectomy AV access stenosis


s/p dialysis 7/2 via cath


K 6.6 this AM- cath not working well


chest tube still in place





Objective





- Vital Signs/Intake and Output


Vital Signs (last 24 hours): 


 











Temp Pulse Resp BP Pulse Ox


 


 98.1 F   51 L  15   148/88   98 


 


 07/03/18 07:58  07/03/18 08:00  07/03/18 07:58  07/03/18 07:58  07/03/18 07:58











- Medications


Medications: 


 Current Medications





Aspirin (Aspirin Chewable)  81 mg PO DAILY [facility]


   Last Admin: 07/03/18 09:04 Dose:  81 mg


Calcitriol (Rocaltrol)  0.25 mcg PO DAILY [facility]


   Last Admin: 07/03/18 09:04 Dose:  0.25 mcg


Calcium Acetate (Phoslo)  1,334 mg PO ACTID [facility]


   Last Admin: 07/03/18 07:25 Dose:  1,334 mg


Cinacalcet (Sensipar)  30 mg PO DAILY [facility]


   Last Admin: 07/03/18 09:04 Dose:  30 mg


Pantoprazole Sodium (Protonix Ec Tab)  40 mg PO DAILY [facility]


   Last Admin: 07/03/18 09:04 Dose:  40 mg


Rosuvastatin Calcium (Crestor)  10 mg PO HS [facility]


   Last Admin: 07/02/18 22:07 Dose:  10 mg


Tramadol HCl (Ultram)  50 mg PO TID PRN


   PRN Reason: Pain, moderate (4-7)


   Last Admin: 07/03/18 00:02 Dose:  50 mg


Vitamin B Complex/Vit C/Folic Acid (Nephro-Gisselle)  1 tab PO DAILY [facility]


   Last Admin: 07/03/18 09:05 Dose:  1 tab











- Labs


Labs: 


 





 07/03/18 06:14 





 07/03/18 06:15 





 











PT  11.4 SECONDS (9.7-12.2)   07/01/18  17:12    


 


INR  1.0   07/01/18  17:12    














- Constitutional


Appears: No Acute Distress, Chronically Ill





- Head Exam


Head Exam: ATRAUMATIC, NORMAL INSPECTION





- Eye Exam


Eye Exam: EOMI, Normal appearance





- Neck Exam


Neck Exam: Normal Inspection.  absent: Tenderness





- Respiratory Exam


Respiratory Exam: Clear to Ausculation Bilateral, NORMAL BREATHING PATTERN





- Cardiovascular Exam


Cardiovascular Exam: REGULAR RHYTHM, +S1





- GI/Abdominal Exam


GI & Abdominal Exam: Soft.  absent: Tenderness





- Extremities Exam


Extremities Exam: Normal Inspection.  absent: Tenderness





- Neurological Exam


Neurological Exam: Awake, CN II-XII Intact





- Skin


Skin Exam: Dry, Warm





Assessment and Plan


(1) Cardiomyopathy


Status: Acute   





(2) Hypertensive chronic kidney disease with stage 5 chronic kidney disease or 

end stage renal disease


Status: Acute   





(3) IgA nephropathy


Status: Acute   





- Assessment and Plan (Free Text)


Plan: 





repeat dialysis now for hyperkalemia- use AV access


CT management as per CTS

## 2018-07-03 NOTE — CP.PCM.PN
<Jean-Pierre Castellanos - Last Filed: 07/03/18 19:55>





Subjective





- Date & Time of Evaluation


Date of Evaluation: 07/03/18


Time of Evaluation: 10:10





- Subjective


Subjective: 





PGY1 Resident Note for Dr. Mendes





Pt seen and examined at bedside. Pt is lying in bed in no acute distress. Pt 

has no complaints, denies chest pain, difficulty breathing, headaches, vomiting

, diarrhea, constipation. 





Objective





- Vital Signs/Intake and Output


Vital Signs (last 24 hours): 


 











Temp Pulse Resp BP Pulse Ox


 


 98.2 F   54 L  15   146/79   97 


 


 07/03/18 04:00  07/03/18 04:00  07/03/18 04:00  07/03/18 04:00  07/03/18 04:00











- Medications


Medications: 


 Current Medications





Aspirin (Aspirin Chewable)  81 mg PO DAILY [facility]


   Last Admin: 07/02/18 12:07 Dose:  81 mg


Calcitriol (Rocaltrol)  0.25 mcg PO DAILY [facility]


   Last Admin: 07/02/18 12:07 Dose:  0.25 mcg


Calcium Acetate (Phoslo)  1,334 mg PO ACTID [facility]


   Last Admin: 07/02/18 17:02 Dose:  1,334 mg


Cinacalcet (Sensipar)  30 mg PO DAILY [facility]


   Last Admin: 07/02/18 12:07 Dose:  30 mg


Pantoprazole Sodium (Protonix Ec Tab)  40 mg PO DAILY [facility]


   Last Admin: 07/02/18 12:07 Dose:  40 mg


Rosuvastatin Calcium (Crestor)  10 mg PO HS [facility]


   Last Admin: 07/02/18 22:07 Dose:  10 mg


Sodium Polystyrene Sulfonate (Kayexalate Susp)  30 gm PO ONCE ONE


   Stop: 07/03/18 07:03


Tramadol HCl (Ultram)  50 mg PO TID PRN


   PRN Reason: Pain, moderate (4-7)


   Last Admin: 07/03/18 00:02 Dose:  50 mg


Vitamin B Complex/Vit C/Folic Acid (Nephro-Isaias)  1 tab PO DAILY [facility]


   Last Admin: 07/02/18 12:07 Dose:  1 tab











- Labs


Labs: 


 





 07/03/18 06:14 





 07/03/18 06:15 





 











PT  11.4 SECONDS (9.7-12.2)   07/01/18  17:12    


 


INR  1.0   07/01/18  17:12    














- Constitutional


Appears: Well, No Acute Distress





- Head Exam


Head Exam: NORMAL INSPECTION





- Eye Exam


Eye Exam: EOMI, Normal appearance





- ENT Exam


ENT Exam: Mucous Membranes Moist





- Respiratory Exam


Respiratory Exam: Chest Wall Tenderness, Clear to Ausculation Bilateral, NORMAL 

BREATHING PATTERN


Additional comments: 





Pt has minor tenderness at chest tube location.





- Cardiovascular Exam


Cardiovascular Exam: +S1, +S2.  absent: Murmur





- GI/Abdominal Exam


GI & Abdominal Exam: Soft, Normal Bowel Sounds





- Extremities Exam


Extremities Exam: Normal Capillary Refill, Normal Inspection





- Neurological Exam


Neurological Exam: Alert, Awake, Oriented x3





- Psychiatric Exam


Psychiatric exam: Normal Affect, Normal Mood





- Skin


Skin Exam: Dry, Normal Color





Assessment and Plan





- Assessment and Plan (Free Text)


Plan: 





1. Right pneumothorax 


7/3:


- Chest tube removed by surgery


- Chest Xray revels trace pneumothorax


- Tramadol 50mg po TID prn


- Dilaudid .5mg IVP q6h prn





2. ESRD on HD


7/3: 


-Pt had dialysis today @ ~3pm


-BMP ordered for 9pm, monitor electrolytes 


-Kayexalate 30 mg once ordered in AM for K+ of >6





7/2:


-Hyperkalemia did not resolve with dialysis on 6/29, suspect recirculation in 

AVF


temporary femoral shiley placed on 6/30 and repeat dialysis- Dr. Lester 

consulted


- hyperkalemia resolved on 7/1, BUN/Cr minimally improved


patient will need fistulogram this week 


- Regular dialysis schedule of MWF at [facility] on [address]


Meds:


- Per vascular on 7/2, high grade mid fistula stenos, attempt at next dialysis 


Cont home med calcitriol 0.25mcg po qd


Cont home med sensipar 30mg po qd


Cont home med Nephro-isaias 1 tab po qd





3. Thrombocytopenia


7/3


-87k for past 2 days, possibly due to dialysis 


-Heme/onc consulted - thanks for recs





4. CHF - Systolic and Diastolic


- Cardiac cath done in 2/2016 showed severe dilated cardiomyopathy and severe 

left ventricular systolic dysfunction, diastolic dysfunction and moderate 

pulmonary hypertension. Patient was placed on lifevest. 


- Echo from 7/2016 showed normal EF - at that time lifevest was discontinued


- Echo 5/2018 showed left ventricle is moderately dilated, moderate concentric 

left ventricle hypertrophy, systolic function is mildly impaired, EF 45%, grade 

II-pseudonormal filling, elevated left atrial pressure, right ventricle 

systolic function normal, left atrium moderately dilated, mild aortic 

regurgitation, mitral regurgitation mild, moderate tricuspid regurgitation, 

moderate pulmonary hypertension


- Carvedilol 6.25mg PO BID (hold for systolic < 100 and/or HR < 60) -  on hold 

due to bradycardia and hypotension


we will resume once electrolytes improved and HR>60


- ASA 81mg PO 


- Crestor 10mg PO HS





5. Pulmonary hypertension


- out patient follow up





6. Prolonged QTC


- Avoid meds prolong QTC





7. Thyroid Nodule


- Found on chest CT


- Will need US outpatient





8. Prophylaxis


- Protonix 40mg PO QD 


- SCDs - DVT risk score of 1


- Renal Diet/Heart healthy





<Jen Mendes V - Last Filed: 07/03/18 20:10>





Objective





- Vital Signs/Intake and Output


Vital Signs (last 24 hours): 


 











Temp Pulse Resp BP Pulse Ox


 


 98.3 F   60   25 H  131/89   99 


 


 07/03/18 16:35  07/03/18 18:00  07/03/18 16:35  07/03/18 16:35  07/03/18 16:35








Intake and Output: 


 











 07/03/18 07/04/18





 18:59 06:59


 


Intake Total 600 


 


Output Total 1550 


 


Balance -950 














- Medications


Medications: 


 Current Medications





Aspirin (Aspirin Chewable)  81 mg PO DAILY [facility]


   Last Admin: 07/03/18 09:04 Dose:  81 mg


Calcitriol (Rocaltrol)  0.25 mcg PO DAILY [facility]


   Last Admin: 07/03/18 09:04 Dose:  0.25 mcg


Calcium Acetate (Phoslo)  1,334 mg PO ACTID [facility]


   Last Admin: 07/03/18 17:08 Dose:  1,334 mg


Cinacalcet (Sensipar)  30 mg PO DAILY [facility]


   Last Admin: 07/03/18 09:04 Dose:  30 mg


Pantoprazole Sodium (Protonix Ec Tab)  40 mg PO DAILY [facility]


   Last Admin: 07/03/18 09:04 Dose:  40 mg


Rosuvastatin Calcium (Crestor)  10 mg PO HS [facility]


   Last Admin: 07/02/18 22:07 Dose:  10 mg


Tramadol HCl (Ultram)  50 mg PO TID PRN


   PRN Reason: Pain, moderate (4-7)


   Last Admin: 07/03/18 00:02 Dose:  50 mg


Vitamin B Complex/Vit C/Folic Acid (Nephro-Isaias)  1 tab PO DAILY [facility]


   Last Admin: 07/03/18 09:05 Dose:  1 tab











- Labs


Labs: 


 





 07/03/18 06:14 





 07/03/18 06:15 





 











PT  11.4 SECONDS (9.7-12.2)   07/01/18  17:12    


 


INR  1.0   07/01/18  17:12    














Attending/Attestation





- Attestation


I have personally seen and examined this patient.: Yes


I have fully participated in the care of the patient.: Yes


I have reviewed all pertinent clinical information, including history, physical 

exam and plan: Yes


Notes (Text): 


Patient seen, examined, and case discussed with medical resident.


Patient seen this morning reports localized pain to the chest tube over the 

right side. Patient denies any acute complaints.


Patient did have a chest x-ray this morning shows resolution of pneumothorax. 

Cardiothoracic surgery have come and evaluated patient have removed Chest tube 

later this evening.


Patient received dose of Kayexlate this AM, did not have bowel movement, 

patient ordered for short dialysis this evening by renal given hyperkalemia. 


Patient continues to have thrombocytopenia no evidence of bleeding no evidence 

of petechiae he start receiving any heparin with his dialysis. Discussed with 

heme-oncology, monitor platelets at this time. HIT/CRISTINA are negative. Recommend 

hepatitis/hiv if not already ordered. 





1.Spontaneous Right pneumothorax 


Assessment/Plan


* Consult thoracic surgeon, Dr Roman help appreciated


* s/p chest tube placed in ED. Repeat CXR shows re-expansion of right lung.


* 7/1/18 Chest xray: no insitu right sided chest tube. No pneumothorax 

identified on this exam.


* Surgery has clamped chest tube 7/2 for possible removal


* Chest Xray (7/3/18): resolution right pneumothorax. No other significant 

interval change.


* Chest Xray (7/3/18): trace right lateral pneumothorax, post chest tube 

removal. 


* CT Chest (6/29/18): right chest tube. trace right pneumothorax. Minimal right 

lateral chest wall subcutaneous emphysema. Right lobe thyroid nodule. 


* pending MICHAEL, alpha-anti-trpysin: 89 (within normal limits); pending phenotype

, HIV





2.ESRD on HD


Assessment/Plan


* Nephrology (Dr. Regan) on consult-->help appreciated


* via Shiley placement patient to have dialysis later


 * Will have short dialysis today given hyperkalemia; ordered for Kayexlate 

today


* Noted: as outpatient patient has outpatient dialysis schedule as MWF at 

[facility] on [address]


* Has high-grade stenosis over the snuffbox dialysis axis.


* Start PhosLo 1334 mg by mouth 3 times a day before meals





3. Dilated Cardiomyopathy


Assessment/Plan


* Cardiac cath done in 2/2016 showed severe dilated cardiomyopathy and severe 

left ventricular systolic dysfunction, diastolic dysfunction and moderate 

pulmonary hypertension. Patient was placed on lifevest.


* MUGA from 5/2016 showed normal EF: 61%; normal, d/c vest no icd continue 

optimal heart failure tx 


* Echo 5/2018 showed left ventricle is moderately dilated, moderate concentric 

left ventricle hypertrophy, systolic function is mildly impaired, EF 45%, grade 

II-pseudonormal filling, elevated left atrial pressure, right ventricle 

systolic function normal, left atrium moderately dilated, mild aortic 

regurgitation, mitral regurgitation mild, moderate tricuspid regurgitation, 

moderate pulmonary hypertension





* ASA 81mg PO daily


* Coreg on hold given borderline hypotension and bradycardia


* Lisinopril on hold given hyperkalemia


* Crestor 10mg PO HS





4. Pulmonary hypertension-out patient follow up





5. Prolonged QTC


* Avoid meds prolong QTC


* Monitor K and Mg2+





6.Thyroid Nodule


* Found on chest CT


* Will need US outpatient





7.Thrombocytopenia


* Hematology oncology (Dr. KEITH Connor) consult-->help appreciated


* Downtrending


* HIT: negative


* CRISTINA: negative





8. PROPHYLAXIS


* Pepcid 20mg PO daily


* SCDs - DVT risk score of 1


* Renal Diet/Heart healthy


* Hold chemical anticoagulation secondary to thrombocytopenia





Disposition: Patient to have dialysis via Shiley today given hyperkalemia. 

patient had chest tube removed today.

## 2018-07-03 NOTE — RAD
HISTORY:

pneumothorax  



COMPARISON:

Chest radiograph dated 07/02/2018. 



FINDINGS:



LUNGS:

No active pulmonary disease.



PLEURA:

No significant pleural effusion identified, no pneumothorax apparent.



CARDIOVASCULAR:

Normal.



OSSEOUS STRUCTURES:

No significant abnormalities.



VISUALIZED UPPER ABDOMEN:

Normal.



OTHER FINDINGS:

Right-sided large-bore chest tube, unchanged.



IMPRESSION:

Resolution right pneumothorax. No other significant interval change.

## 2018-07-03 NOTE — CP.PCM.CON
History of Present Illness





- History of Present Illness


History of Present Illness: 





33 year old male with a history of cardiomyopathy, IgA nephropathy, ESRD on HD, 

admitted with spontaneous right sided pneumothorax, with thrombocytopenia and 

anemia.  The patient denies blood problems in the past.  He notes to feeling 

better since his chest tube has been placed.  Review of his labs shows 

intermittent thrombocytopenia since 2016.





Past medical history:  Cardiomyopathy, IgA nephropathy, ESRD





Past surgical history:  AV fistula





Family history:  Prosper hematologic and oncologic problems





Social history:  Denies tobacco, alcohol, and illicit drug use





Allergies:  NKA





Review of systemsL  All remaining review of systems including HEENT, 

cardiovascular, respiratory, gastrointestinal, genitourinary, musculoskeletal, 

dermatologic, neurologic, and psychiatric are negative unless mentioned in the 

HPI.





Past Patient History





- Infectious Disease


Hx of Infectious Diseases: None





- Tetanus Immunizations


Tetanus Immunization: Unknown





- Past Medical History & Family History


Past Medical History?: Yes


Past Family History: Reviewed and not pertinent





- Past Social History


Smoking Status: Never Smoked


Chewing Tobacco Use: No


Cigar Use: No


Alcohol: None


Drugs: Denies


Home Situation {Lives}: With Family





- CARDIAC


Hx Hypercholesterolemia: Yes


Hx Hypertension: Yes





- PULMONARY


Other/Comment: SOB A COUPLE DAYS AGO





- NEUROLOGICAL


Hx Neurological Disorder: No





- HEENT


Other/Comment: L EYE BLURRY





- RENAL


Hx Chronic Kidney Disease: No


Date of Last Dialysis Treatment: 06/27/18





- ENDOCRINE/METABOLIC


Hx Endocrine Disorders: No





- HEMATOLOGICAL/ONCOLOGICAL


Hx Blood Disorders: No





- INTEGUMENTARY


Hx Dermatological Problems: No





- MUSCULOSKELETAL/RHEUMATOLOGICAL


Hx Musculoskeletal Disorders: No


Hx Falls: No





- GASTROINTESTINAL


Hx Gastrointestinal Disorders: No





- GENITOURINARY/GYNECOLOGICAL


Other/Comment: Dilaysis ; MWF





- PSYCHIATRIC


Hx Substance Use: No





- SURGICAL HISTORY


Hx Surgeries: No


Other/Comment: Lt. arm AV shunt insertion





- ANESTHESIA


Hx Anesthesia: Yes


Hx Anesthesia Reactions: No


Hx Malignant Hyperthermia: No


Has any member of the family had a problem w/ anesthesia?: No





Meds


Allergies/Adverse Reactions: 


 Allergies











Allergy/AdvReac Type Severity Reaction Status Date / Time


 


No Known Allergies Allergy   Verified 06/28/18 12:00














- Medications


Medications: 


 Current Medications





Aspirin (Aspirin Chewable)  81 mg PO DAILY [facility]


   Last Admin: 07/03/18 09:04 Dose:  81 mg


Calcitriol (Rocaltrol)  0.25 mcg PO DAILY [facility]


   Last Admin: 07/03/18 09:04 Dose:  0.25 mcg


Calcium Acetate (Phoslo)  1,334 mg PO ACTID [facility]


   Last Admin: 07/03/18 12:35 Dose:  1,334 mg


Cinacalcet (Sensipar)  30 mg PO DAILY [facility]


   Last Admin: 07/03/18 09:04 Dose:  30 mg


Pantoprazole Sodium (Protonix Ec Tab)  40 mg PO DAILY [facility]


   Last Admin: 07/03/18 09:04 Dose:  40 mg


Rosuvastatin Calcium (Crestor)  10 mg PO HS [facility]


   Last Admin: 07/02/18 22:07 Dose:  10 mg


Tramadol HCl (Ultram)  50 mg PO TID PRN


   PRN Reason: Pain, moderate (4-7)


   Last Admin: 07/03/18 00:02 Dose:  50 mg


Vitamin B Complex/Vit C/Folic Acid (Nephro-Gisselle)  1 tab PO DAILY [facility]


   Last Admin: 07/03/18 09:05 Dose:  1 tab











Physical Exam





- Head Exam


Head Exam: ATRAUMATIC





- Eye Exam


Eye Exam: Normal appearance





- ENT Exam


ENT Exam: Mucous Membranes Dry





- Respiratory Exam


Respiratory Exam: NORMAL BREATHING PATTERN





- Cardiovascular Exam


Cardiovascular Exam: +S1, +S2





- GI/Abdominal Exam


GI & Abdominal Exam: Normal Bowel Sounds





- Extremities Exam


Extremities exam: Positive for: normal inspection





- Neurological Exam


Neurological exam: Oriented x3





- Psychiatric Exam


Psychiatric exam: Normal Affect, Normal Mood





- Skin


Skin Exam: Warm





Results





- Vital Signs


Recent Vital Signs: 


 Last Vital Signs











Temp  98.1 F   07/03/18 07:58


 


Pulse  51 L  07/03/18 08:00


 


Resp  15   07/03/18 07:58


 


BP  148/88   07/03/18 07:58


 


Pulse Ox  98   07/03/18 07:58














- Labs


Result Diagrams: 


 07/03/18 21:16





 07/03/18 22:16


Labs: 


 Laboratory Results - last 24 hr











  06/28/18 07/03/18 07/03/18





  18:50 06:14 06:15


 


WBC   5.7 


 


RBC   3.49 L 


 


Hgb   11.2 L 


 


Hct   32.4 L 


 


MCV   92.8 


 


MCH   32.1 H 


 


MCHC   34.6 


 


RDW   14.2 


 


Plt Count   87 L 


 


MPV   9.4 


 


Neut % (Auto)   74.1 


 


Lymph % (Auto)   15.9 L 


 


Mono % (Auto)   7.5 


 


Eos % (Auto)   2.0 


 


Baso % (Auto)   0.5 


 


Neut # (Auto)   4.2 


 


Lymph # (Auto)   0.9 L 


 


Mono # (Auto)   0.4 


 


Eos # (Auto)   0.1 


 


Baso # (Auto)   0.0 


 


Sodium    142


 


Potassium    6.6 H* D


 


Chloride    102


 


Carbon Dioxide    26


 


Anion Gap    21 H


 


BUN    60 H


 


Creatinine    10.7 H*


 


Est GFR ( Amer)    7


 


Est GFR (Non-Af Amer)    6


 


Random Glucose    85


 


Calcium    9.1


 


Phosphorus    7.6 H


 


Magnesium    2.5 H


 


Total Bilirubin    0.4


 


AST    10 L


 


ALT    32


 


Alkaline Phosphatase    36 L


 


Total Protein    6.3


 


Albumin    3.6


 


Globulin    2.7


 


Albumin/Globulin Ratio    1.4


 


Alpha-1-Antitrypsin  86  














Assessment & Plan


(1) Thrombocytopenia


Assessment and Plan: 


recommend checking manual plt count to rule out plt clumping (

pseudothrombocytopenia)


chronic since 2016


f/u heparin Ab and serotonin release assay


f/u HIV and hepatitis panel


? ITP





Status: Acute   





(2) Anemia


Status: Acute   





(3) Anemia


Assessment and Plan: 


f/u retic count, b12, folate, ferritin


anemia of CKD; BOYD per renal





Thank you for this interesting consult.


Status: Acute

## 2018-07-04 LAB
ALBUMIN SERPL-MCNC: 3.6 G/DL (ref 3.5–5)
ALBUMIN/GLOB SERPL: 1.6 {RATIO} (ref 1–2.1)
ALT SERPL-CCNC: 32 U/L (ref 21–72)
AST SERPL-CCNC: 15 U/L (ref 17–59)
BASOPHILS # BLD AUTO: 0 K/UL (ref 0–0.2)
BASOPHILS NFR BLD: 0.6 % (ref 0–2)
BUN SERPL-MCNC: 48 MG/DL (ref 9–20)
CALCIUM SERPL-MCNC: 8.9 MG/DL (ref 8.6–10.4)
EOSINOPHIL # BLD AUTO: 0.1 K/UL (ref 0–0.7)
EOSINOPHIL NFR BLD: 2.4 % (ref 0–4)
ERYTHROCYTE [DISTWIDTH] IN BLOOD BY AUTOMATED COUNT: 14.5 % (ref 11.5–14.5)
GFR NON-AFRICAN AMERICAN: 7
HGB BLD-MCNC: 10.8 G/DL (ref 12–18)
LYMPHOCYTES # BLD AUTO: 1 K/UL (ref 1–4.3)
LYMPHOCYTES NFR BLD AUTO: 19.8 % (ref 20–40)
MCH RBC QN AUTO: 32.2 PG (ref 27–31)
MCHC RBC AUTO-ENTMCNC: 34.9 G/DL (ref 33–37)
MCV RBC AUTO: 92.4 FL (ref 80–94)
MONOCYTES # BLD: 0.4 K/UL (ref 0–0.8)
MONOCYTES NFR BLD: 8.6 % (ref 0–10)
NEUTROPHILS # BLD: 3.4 K/UL (ref 1.8–7)
NEUTROPHILS NFR BLD AUTO: 68.6 % (ref 50–75)
NRBC BLD AUTO-RTO: 0 % (ref 0–2)
PLATELET # BLD: 81 K/UL (ref 130–400)
PMV BLD AUTO: 8.7 FL (ref 7.2–11.7)
RBC # BLD AUTO: 3.33 MIL/UL (ref 4.4–5.9)
WBC # BLD AUTO: 5 K/UL (ref 4.8–10.8)

## 2018-07-04 RX ADMIN — PANTOPRAZOLE SODIUM SCH MG: 40 TABLET, DELAYED RELEASE ORAL at 09:25

## 2018-07-04 RX ADMIN — Medication SCH TAB: at 10:07

## 2018-07-04 NOTE — RAD
HISTORY:

s/p chest tube removal  



COMPARISON:

Chest radiograph dated 07/03/2018. 



FINDINGS:



LUNGS:

No active pulmonary disease.



PLEURA:

No significant pleural effusion identified, no pneumothorax apparent.



CARDIOVASCULAR:

Normal.



OSSEOUS STRUCTURES:

No significant abnormalities.



VISUALIZED UPPER ABDOMEN:

Normal.



OTHER FINDINGS:

None.



IMPRESSION:

Resolution of right pneumothorax.

## 2018-07-04 NOTE — CP.PCM.PN
Subjective





- Date & Time of Evaluation


Date of Evaluation: 07/04/18


Time of Evaluation: 07:57





- Subjective


Subjective: 





Cardiothoracic Surgery Progress Note for Dr. Roman





This 33M was seen and examined this Am at bedside. No acute events overnight. 

Patient denies any chest pain or SOB. No new complaints at this time. 





Objective





- Vital Signs/Intake and Output


Vital Signs (last 24 hours): 


 











Temp Pulse Resp BP Pulse Ox


 


 98.4 F   50 L  18   130/83   99 


 


 07/04/18 00:00  07/04/18 07:32  07/04/18 00:00  07/04/18 00:00  07/04/18 00:00








Intake and Output: 


 











 07/04/18 07/04/18





 06:59 18:59


 


Intake Total 850 


 


Balance 850 














- Medications


Medications: 


 Current Medications





Aspirin (Aspirin Chewable)  81 mg PO DAILY [facility]


   Last Admin: 07/03/18 09:04 Dose:  81 mg


Calcitriol (Rocaltrol)  0.25 mcg PO DAILY [facility]


   Last Admin: 07/03/18 09:04 Dose:  0.25 mcg


Calcium Acetate (Phoslo)  1,334 mg PO ACTID [facility]


   Last Admin: 07/03/18 17:08 Dose:  1,334 mg


Cinacalcet (Sensipar)  30 mg PO DAILY [facility]


   Last Admin: 07/03/18 09:04 Dose:  30 mg


Pantoprazole Sodium (Protonix Ec Tab)  40 mg PO DAILY [facility]


   Last Admin: 07/03/18 09:04 Dose:  40 mg


Rosuvastatin Calcium (Crestor)  10 mg PO HS [facility]


   Last Admin: 07/03/18 22:09 Dose:  10 mg


Tramadol HCl (Ultram)  50 mg PO TID PRN


   PRN Reason: Pain, moderate (4-7)


   Last Admin: 07/03/18 00:02 Dose:  50 mg


Vitamin B Complex/Vit C/Folic Acid (Nephro-Gisselle)  1 tab PO DAILY [facility]


   Last Admin: 07/03/18 09:05 Dose:  1 tab











- Labs


Labs: 


 





 07/04/18 05:59 





 07/04/18 06:00 





 











PT  11.4 SECONDS (9.7-12.2)   07/01/18  17:12    


 


INR  1.0   07/01/18  17:12    














- Constitutional


Appears: Non-toxic, No Acute Distress





- Head Exam


Head Exam: ATRAUMATIC, NORMOCEPHALIC





- Eye Exam


Eye Exam: EOMI





- ENT Exam


ENT Exam: Mucous Membranes Moist





- Respiratory Exam


Respiratory Exam: NORMAL BREATHING PATTERN





- Cardiovascular Exam


Cardiovascular Exam: +S1, +S2





- GI/Abdominal Exam


GI & Abdominal Exam: Soft.  absent: Distended, Firm, Guarding, Rigid, Tenderness





- Neurological Exam


Neurological Exam: Alert, Awake





- Psychiatric Exam


Psychiatric exam: Normal Affect, Normal Mood





- Skin


Skin Exam: Dry, Intact





Assessment and Plan





- Assessment and Plan (Free Text)


Assessment: 


33M with spontaneous pneumothorax s/p chest tube removal 


Post pull cxr with residual pneumothorax


repeat cxr this AM reads resolution of pneumothorax


Patient is clear for discharge from surgical perspective


May shower in 2 days, no soaking/baths/pools for 2 weeks


Call Dr. Roman's office upon discharge to schedule followup in 1-2 weeks


D/W Dr. Jessie Dickson PGY2


[phone]

## 2018-07-04 NOTE — CP.PCM.PN
Subjective





- Date & Time of Evaluation


Date of Evaluation: 07/04/18


Time of Evaluation: 08:23





- Subjective


Subjective: 





s/p extra HD 7/3


repeat K of 8 was in error


feels ok now; no new complaint


chest tube removed


Av fistula worked well 7/3





Objective





- Vital Signs/Intake and Output


Vital Signs (last 24 hours): 


 











Temp Pulse Resp BP Pulse Ox


 


 98.4 F   50 L  18   130/83   99 


 


 07/04/18 00:00  07/04/18 07:32  07/04/18 00:00  07/04/18 00:00  07/04/18 00:00








Intake and Output: 


 











 07/04/18 07/04/18





 06:59 18:59


 


Intake Total 850 


 


Balance 850 














- Medications


Medications: 


 Current Medications





Aspirin (Aspirin Chewable)  81 mg PO DAILY [facility]


   Last Admin: 07/03/18 09:04 Dose:  81 mg


Calcitriol (Rocaltrol)  0.25 mcg PO DAILY [facility]


   Last Admin: 07/03/18 09:04 Dose:  0.25 mcg


Calcium Acetate (Phoslo)  1,334 mg PO ACTID [facility]


   Last Admin: 07/03/18 17:08 Dose:  1,334 mg


Cinacalcet (Sensipar)  30 mg PO DAILY [facility]


   Last Admin: 07/03/18 09:04 Dose:  30 mg


Pantoprazole Sodium (Protonix Ec Tab)  40 mg PO DAILY [facility]


   Last Admin: 07/03/18 09:04 Dose:  40 mg


Rosuvastatin Calcium (Crestor)  10 mg PO HS [facility]


   Last Admin: 07/03/18 22:09 Dose:  10 mg


Tramadol HCl (Ultram)  50 mg PO TID PRN


   PRN Reason: Pain, moderate (4-7)


   Last Admin: 07/03/18 00:02 Dose:  50 mg


Vitamin B Complex/Vit C/Folic Acid (Nephro-Gisselle)  1 tab PO DAILY [facility]


   Last Admin: 07/03/18 09:05 Dose:  1 tab











- Labs


Labs: 


 





 07/04/18 05:59 





 07/04/18 06:00 





 











PT  11.4 SECONDS (9.7-12.2)   07/01/18  17:12    


 


INR  1.0   07/01/18  17:12    














- Constitutional


Appears: No Acute Distress, Chronically Ill





- Head Exam


Head Exam: ATRAUMATIC, NORMAL INSPECTION





- Eye Exam


Eye Exam: EOMI, Normal appearance





- Neck Exam


Neck Exam: Normal Inspection.  absent: Tenderness





- Respiratory Exam


Respiratory Exam: Clear to Ausculation Bilateral, NORMAL BREATHING PATTERN





- Cardiovascular Exam


Cardiovascular Exam: REGULAR RHYTHM, +S1





- GI/Abdominal Exam


GI & Abdominal Exam: Soft.  absent: Tenderness





- Extremities Exam


Extremities Exam: Normal Inspection.  absent: Tenderness





- Neurological Exam


Neurological Exam: Awake, CN II-XII Intact





- Skin


Skin Exam: Dry, Warm





Assessment and Plan


(1) Cardiomyopathy


Status: Acute   





(2) Hypertensive chronic kidney disease with stage 5 chronic kidney disease or 

end stage renal disease


Status: Acute   





(3) IgA nephropathy


Status: Acute   





- Assessment and Plan (Free Text)


Plan: 





regular dialysis today- use AV access


same meds

## 2018-07-04 NOTE — CP.PCM.PN
<Jean-Pierre Castellanos M - Last Filed: 07/04/18 18:14>





Subjective





- Date & Time of Evaluation


Date of Evaluation: 07/04/18


Time of Evaluation: 09:10





- Subjective


Subjective: 





PGY1 Resident Note for Dr. Mendes





Pt seen and examined at bedside. Pt was lying in bed on the phone in no acute 

distress. Pt has no complaints. Pt denies headaches, chest pain, difficulty 

breathing, diarrhea, constipation, nausea, and vommiting. Pt states his chest 

tube location has no residual tenderness from yesterdays removal. Pt 

additionally denies any pain at femoral shiley location. Pt states dialysis 

yesterday was performed through is port on left arm.  





Objective





- Vital Signs/Intake and Output


Vital Signs (last 24 hours): 


 











Temp Pulse Resp BP Pulse Ox


 


 98.4 F   68   18   130/83   99 


 


 07/04/18 00:00  07/04/18 00:00  07/04/18 00:00  07/04/18 00:00  07/04/18 00:00








Intake and Output: 


 











 07/03/18 07/04/18





 18:59 06:59


 


Intake Total 600 


 


Output Total 1550 


 


Balance -950 














- Medications


Medications: 


 Current Medications





Aspirin (Aspirin Chewable)  81 mg PO DAILY [facility]


   Last Admin: 07/03/18 09:04 Dose:  81 mg


Calcitriol (Rocaltrol)  0.25 mcg PO DAILY [facility]


   Last Admin: 07/03/18 09:04 Dose:  0.25 mcg


Calcium Acetate (Phoslo)  1,334 mg PO ACTID [facility]


   Last Admin: 07/03/18 17:08 Dose:  1,334 mg


Cinacalcet (Sensipar)  30 mg PO DAILY [facility]


   Last Admin: 07/03/18 09:04 Dose:  30 mg


Pantoprazole Sodium (Protonix Ec Tab)  40 mg PO DAILY [facility]


   Last Admin: 07/03/18 09:04 Dose:  40 mg


Rosuvastatin Calcium (Crestor)  10 mg PO HS [facility]


   Last Admin: 07/03/18 22:09 Dose:  10 mg


Tramadol HCl (Ultram)  50 mg PO TID PRN


   PRN Reason: Pain, moderate (4-7)


   Last Admin: 07/03/18 00:02 Dose:  50 mg


Vitamin B Complex/Vit C/Folic Acid (Nephro-Isaias)  1 tab PO DAILY [facility]


   Last Admin: 07/03/18 09:05 Dose:  1 tab











- Labs


Labs: 


 





 07/04/18 05:59 





 07/04/18 06:00 





 











PT  11.4 SECONDS (9.7-12.2)   07/01/18  17:12    


 


INR  1.0   07/01/18  17:12    














- Constitutional


Appears: Well, No Acute Distress





- Head Exam


Head Exam: NORMAL INSPECTION





- Eye Exam


Eye Exam: EOMI, Normal appearance





- ENT Exam


ENT Exam: Mucous Membranes Moist





- Respiratory Exam


Respiratory Exam: Clear to Ausculation Bilateral, NORMAL BREATHING PATTERN.  

absent: Rales, Rhonchi, Wheezes





- Cardiovascular Exam


Cardiovascular Exam: +S1, +S2





- GI/Abdominal Exam


GI & Abdominal Exam: Soft, Normal Bowel Sounds.  absent: Guarding, Rigid





- Extremities Exam


Extremities Exam: Full ROM.  absent: Pedal Edema, Tenderness


Additional comments: 





Pt has femoral shiley location clean and dry with dressing intact.





Pt has intact AV fistula on L arm.





- Neurological Exam


Neurological Exam: Alert, Awake, Oriented x3





- Psychiatric Exam


Psychiatric exam: Normal Affect, Normal Mood





- Skin


Skin Exam: Dry, Normal Color





Assessment and Plan





- Assessment and Plan (Free Text)


Plan: 





1. Right pneumothorax 


7/4:


- AM Chest Xray shows resolution of pneumothorax


- Chest tube removed by surgery on 7/3


- Chest tube site clean, dry, dressing intact 


- Tramadol 50mg po TID prn


- Dilaudid .5mg IVP q6h prn





2. ESRD on HD


7/4: 


-Pt had diaylsis 7/3 through AV fistula - working & functional


-Per verbal communication with Dialysis nurse, pt will have Dialysis tomorrow 7/ 5


-Femoral Shiley removed & dressing placed


7/3: 


-Pt had dialysis today @ ~3pm


-BMP ordered for 9pm, monitor electrolytes 


-Kayexalate 30 mg once ordered in AM for K+ of >6





7/2:


-Hyperkalemia did not resolve with dialysis on 6/29, suspect recirculation in 

AVF


temporary femoral shiley placed on 6/30 and repeat dialysis- Dr. Lester 

consulted


- hyperkalemia resolved on 7/1, BUN/Cr minimally improved


patient will need fistulogram this week 


- Regular dialysis schedule of MWF at [facility] on [address]


Meds:


- Per vascular on 7/2, high grade mid fistula stenos, attempt at next dialysis 


Cont home med calcitriol 0.25mcg po qd


Cont home med sensipar 30mg po qd


Cont home med Nephro-isaias 1 tab po qd





3. Thrombocytopenia


7/4


-81k platlet count today


-Hep Ab assay neg, serotonin assay neg. 


-F/u ID recs HIV, Hep, rectic, B12, folate, ferritin





7/3


-87k for past 2 days, possibly due to dialysis 


-Heme/onc consulted - thanks for recs





4. CHF - Systolic and Diastolic


7/4


-D/C telemetry





7/3


- Cardiac cath done in 2/2016 showed severe dilated cardiomyopathy and severe 

left ventricular systolic dysfunction, diastolic dysfunction and moderate 

pulmonary hypertension. Patient was placed on lifevest. 


- Echo from 7/2016 showed normal EF - at that time lifevest was discontinued


- Echo 5/2018 showed left ventricle is moderately dilated, moderate concentric 

left ventricle hypertrophy, systolic function is mildly impaired, EF 45%, grade 

II-pseudonormal filling, elevated left atrial pressure, right ventricle 

systolic function normal, left atrium moderately dilated, mild aortic 

regurgitation, mitral regurgitation mild, moderate tricuspid regurgitation, 

moderate pulmonary hypertension


- Carvedilol 6.25mg PO BID (hold for systolic < 100 and/or HR < 60) -  on hold 

due to bradycardia and hypotension


we will resume once electrolytes improved and HR>60


- ASA 81mg PO 


- Crestor 10mg PO HS





5. Pulmonary hypertension


- out patient follow up





6. Prolonged QTC


- Avoid meds prolong QTC





7. Thyroid Nodule


- Found on chest CT


- Will need US outpatient





8. Prophylaxis


- Protonix 40mg PO QD 


- SCDs - DVT risk score of 1


- Renal Diet/Heart healthy





<Jen Mendes V - Last Filed: 07/05/18 07:23>





Objective





- Vital Signs/Intake and Output


Vital Signs (last 24 hours): 


 











Temp Pulse Resp BP Pulse Ox


 


 98.4 F   64   20   142/86   99 


 


 07/05/18 00:00  07/05/18 00:00  07/05/18 00:00  07/05/18 00:00  07/05/18 00:00








Intake and Output: 


 











 07/05/18 07/05/18





 06:59 18:59


 


Intake Total 480 


 


Balance 480 














- Medications


Medications: 


 Current Medications





Aspirin (Aspirin Chewable)  81 mg PO DAILY [facility]


   Last Admin: 07/04/18 09:25 Dose:  81 mg


Calcitriol (Rocaltrol)  0.25 mcg PO DAILY [facility]


   Last Admin: 07/04/18 09:25 Dose:  0.25 mcg


Calcium Acetate (Phoslo)  1,334 mg PO ACTID [facility]


   Last Admin: 07/04/18 15:20 Dose:  1,334 mg


Cinacalcet (Sensipar)  30 mg PO DAILY [facility]


   Last Admin: 07/04/18 09:25 Dose:  30 mg


Pantoprazole Sodium (Protonix Ec Tab)  40 mg PO DAILY [facility]


   Last Admin: 07/04/18 09:25 Dose:  40 mg


Rosuvastatin Calcium (Crestor)  10 mg PO HS [facility]


   Last Admin: 07/04/18 22:04 Dose:  10 mg


Tramadol HCl (Ultram)  50 mg PO TID PRN


   PRN Reason: Pain, moderate (4-7)


   Last Admin: 07/03/18 00:02 Dose:  50 mg


Vitamin B Complex/Vit C/Folic Acid (Nephro-Isaias)  1 tab PO DAILY [facility]


   Last Admin: 07/04/18 10:07 Dose:  1 tab











- Labs


Labs: 


 





 07/04/18 05:59 





 07/04/18 06:00 





 











PT  11.4 SECONDS (9.7-12.2)   07/01/18  17:12    


 


INR  1.0   07/01/18  17:12    














Attending/Attestation





- Attestation


I have personally seen and examined this patient.: Yes


I have fully participated in the care of the patient.: Yes


I have reviewed all pertinent clinical information, including history, physical 

exam and plan: Yes


Notes (Text): 


This is late computer entry for 7/4/18. 


Patient seen, examined, and case discussed with medical resident with wife at 

bedside.


Patient denies any acute complaints.


Patient did have a chest x-ray this morning shows resolution of pneumothorax. 

Cardiothoracic surgery have come and evaluated patient have removed Chest tube 

yesterday.


Discussed case with nephrology, recommends to remove Shiley catheter and 

reports snuffbox shunt is working well during yesterday dialysis.


Resident has spoken with vascular surgery team, Shiley removed in the afternoon.


We will plan for discharge after next dialysis session.


Will continue to monitor platelets. 





1.Spontaneous Right pneumothorax 


Assessment/Plan


* Consult thoracic surgeon, Dr Roman help appreciated


* s/p chest tube placed in ED. Repeat CXR shows re-expansion of right lung.


* 7/1/18 Chest xray: no insitu right sided chest tube. No pneumothorax 

identified on this exam.


* Surgery has clamped chest tube 7/2 for possible removal; removed 7/3/18


* Chest Xray (7/3/18): resolution right pneumothorax. No other significant 

interval change.


* Chest Xray (7/3/18): trace right lateral pneumothorax, post chest tube 

removal. 


* CT Chest (6/29/18): right chest tube. trace right pneumothorax. Minimal right 

lateral chest wall subcutaneous emphysema. Right lobe thyroid nodule. 


* pending MICHAEL, alpha-anti-trpysin: 89 (within normal limits); pending phenotype

, HIV





2.ESRD on HD


Assessment/Plan


* Nephrology (Dr. Regan) on consult-->help appreciated


* via Shiley placement patient to have dialysis later


 * Will have short dialysis today given hyperkalemia; ordered for Kayexlate 

today


* Noted: as outpatient patient has outpatient dialysis schedule as MWF at 

[facility] on [facility]


* Has high-grade stenosis over the snuffbox dialysis axis.


* Start PhosLo 1334 mg by mouth 3 times a day before meals





3. Dilated Cardiomyopathy


Assessment/Plan


* Cardiac cath done in 2/2016 showed severe dilated cardiomyopathy and severe 

left ventricular systolic dysfunction, diastolic dysfunction and moderate 

pulmonary hypertension. Patient was placed on lifevest.


* MUGA from 5/2016 showed normal EF: 61%; normal, d/c vest no icd continue 

optimal heart failure tx 


* Echo 5/2018 showed left ventricle is moderately dilated, moderate concentric 

left ventricle hypertrophy, systolic function is mildly impaired, EF 45%, grade 

II-pseudonormal filling, elevated left atrial pressure, right ventricle 

systolic function normal, left atrium moderately dilated, mild aortic 

regurgitation, mitral regurgitation mild, moderate tricuspid regurgitation, 

moderate pulmonary hypertension





* ASA 81mg PO daily


* Coreg on hold given borderline hypotension and bradycardia


* Lisinopril on hold given hyperkalemia


* Crestor 10mg PO HS





4. Pulmonary hypertension-out patient follow up





5. Prolonged QTC


* Avoid meds prolong QTC


* Monitor K and Mg2+





6.Thyroid Nodule


* Found on chest CT


* Will need US outpatient





7.Thrombocytopenia


* Hematology oncology (Dr. KEITH Connor) consult-->help appreciated


* HIT: negative


* CRISTINA: negative


* monitor


* f/u hepatitis and HIV





8. PROPHYLAXIS


* Pepcid 20mg PO daily


* SCDs - DVT risk score of 1


* Renal Diet/Heart healthy


* Hold chemical anticoagulation secondary to thrombocytopenia





Disposition: Shiley catheter to be removed; discharge after dialysis tomorrow.

## 2018-07-05 VITALS
HEART RATE: 62 BPM | RESPIRATION RATE: 20 BRPM | DIASTOLIC BLOOD PRESSURE: 63 MMHG | TEMPERATURE: 98.3 F | OXYGEN SATURATION: 100 % | SYSTOLIC BLOOD PRESSURE: 108 MMHG

## 2018-07-05 LAB
ALBUMIN SERPL-MCNC: 3.7 G/DL (ref 3.5–5)
ALBUMIN/GLOB SERPL: 1.5 {RATIO} (ref 1–2.1)
ALT SERPL-CCNC: 28 U/L (ref 21–72)
AST SERPL-CCNC: 13 U/L (ref 17–59)
BASOPHILS # BLD AUTO: 0 K/UL (ref 0–0.2)
BASOPHILS NFR BLD: 0.7 % (ref 0–2)
BUN SERPL-MCNC: 81 MG/DL (ref 9–20)
CALCIUM SERPL-MCNC: 9.6 MG/DL (ref 8.6–10.4)
EOSINOPHIL # BLD AUTO: 0.1 K/UL (ref 0–0.7)
EOSINOPHIL NFR BLD: 3.5 % (ref 0–4)
ERYTHROCYTE [DISTWIDTH] IN BLOOD BY AUTOMATED COUNT: 14.6 % (ref 11.5–14.5)
FERRITIN SERPL-MCNC: 719 NG/ML
FOLATE SERPL-MCNC: > 20 NG/ML
GFR NON-AFRICAN AMERICAN: 5
HGB BLD-MCNC: 10.5 G/DL (ref 12–18)
LYMPHOCYTES # BLD AUTO: 1 K/UL (ref 1–4.3)
LYMPHOCYTES NFR BLD AUTO: 24.6 % (ref 20–40)
MCH RBC QN AUTO: 32.1 PG (ref 27–31)
MCHC RBC AUTO-ENTMCNC: 35.2 G/DL (ref 33–37)
MCV RBC AUTO: 91.2 FL (ref 80–94)
MONOCYTES # BLD: 0.4 K/UL (ref 0–0.8)
MONOCYTES NFR BLD: 8.5 % (ref 0–10)
NEUTROPHILS # BLD: 2.6 K/UL (ref 1.8–7)
NEUTROPHILS NFR BLD AUTO: 62.7 % (ref 50–75)
NRBC BLD AUTO-RTO: 0.1 % (ref 0–2)
PLATELET # BLD: 86 K/UL (ref 130–400)
PMV BLD AUTO: 8.8 FL (ref 7.2–11.7)
RBC # BLD AUTO: 3.28 MIL/UL (ref 4.4–5.9)
VIT B12 SERPL-MCNC: 819 PG/ML (ref 239–931)
WBC # BLD AUTO: 4.1 K/UL (ref 4.8–10.8)

## 2018-07-05 PROCEDURE — 5A1D70Z PERFORMANCE OF URINARY FILTRATION, INTERMITTENT, LESS THAN 6 HOURS PER DAY: ICD-10-PCS

## 2018-07-05 RX ADMIN — PANTOPRAZOLE SODIUM SCH MG: 40 TABLET, DELAYED RELEASE ORAL at 14:26

## 2018-07-05 RX ADMIN — PANTOPRAZOLE SODIUM SCH: 40 TABLET, DELAYED RELEASE ORAL at 10:00

## 2018-07-05 RX ADMIN — Medication SCH: at 10:00

## 2018-07-05 RX ADMIN — Medication SCH TAB: at 14:26

## 2018-07-05 NOTE — CP.PCM.DIS
Provider





- Provider


Date of Admission: 


06/28/18 15:29





Attending physician: 


Jen Mendes DO





Primary care physician: 





[facility] in [facility]


Consults: 





Dr. Regan, Dr Roman, Dr. Sena


Time Spent in preparation of Discharge (in minutes): 40





Hospital Course





- Lab Results


Lab Results: 


 Micro Results





06/28/18 22:47   Nose   MRSA Culture (Admit) - Final


                            MRSA NOT DETECTED





 Most Recent Lab Values











WBC  4.1 K/uL (4.8-10.8)  L  07/05/18  07:05    


 


RBC  3.28 Mil/uL (4.40-5.90)  L  07/05/18  07:05    


 


Hgb  10.5 g/dL (12.0-18.0)  L  07/05/18  07:05    


 


Hct  29.9 % (35.0-51.0)  L  07/05/18  07:05    


 


MCV  91.2 fL (80.0-94.0)   07/05/18  07:05    


 


MCH  32.1 pg (27.0-31.0)  H  07/05/18  07:05    


 


MCHC  35.2 g/dL (33.0-37.0)   07/05/18  07:05    


 


RDW  14.6 % (11.5-14.5)  H  07/05/18  07:05    


 


Plt Count  86 K/uL (130-400)  L  07/05/18  07:05    


 


MPV  8.8 fL (7.2-11.7)   07/05/18  07:05    


 


Neut % (Auto)  62.7 % (50.0-75.0)   07/05/18  07:05    


 


Lymph % (Auto)  24.6 % (20.0-40.0)   07/05/18  07:05    


 


Mono % (Auto)  8.5 % (0.0-10.0)   07/05/18  07:05    


 


Eos % (Auto)  3.5 % (0.0-4.0)   07/05/18  07:05    


 


Baso % (Auto)  0.7 % (0.0-2.0)   07/05/18  07:05    


 


Neut # (Auto)  2.6 K/uL (1.8-7.0)   07/05/18  07:05    


 


Lymph # (Auto)  1.0 K/uL (1.0-4.3)   07/05/18  07:05    


 


Mono # (Auto)  0.4 K/uL (0.0-0.8)   07/05/18  07:05    


 


Eos # (Auto)  0.1 K/uL (0.0-0.7)   07/05/18  07:05    


 


Baso # (Auto)  0.0 K/uL (0.0-0.2)   07/05/18  07:05    


 


Differential Comment     07/01/18  06:51    


 


Retic Count  0.3 % (0.5-1.5)  L D 07/05/18  07:05    


 


PT  11.4 SECONDS (9.7-12.2)   07/01/18  17:12    


 


INR  1.0   07/01/18  17:12    


 


Sodium  139 mmol/L (132-148)   07/05/18  07:05    


 


Potassium  5.4 mmol/L (3.6-5.2)  H  07/05/18  07:05    


 


Chloride  96 mmol/L ()  L  07/05/18  07:05    


 


Carbon Dioxide  26 mmol/L (22-30)   07/05/18  07:05    


 


Anion Gap  22  (10-20)  H  07/05/18  07:05    


 


BUN  81 mg/dL (9-20)  H  07/05/18  07:05    


 


Creatinine  11.9 mg/dL (0.8-1.5)  H* D 07/05/18  07:05    


 


Est GFR ( Amer)  6   07/05/18  07:05    


 


Est GFR (Non-Af Amer)  5   07/05/18  07:05    


 


POC Glucose (mg/dL)  97 mg/dL ()   07/01/18  11:14    


 


Random Glucose  93 mg/dL ()   07/05/18  07:05    


 


Lactic Acid  1.0 mmol/L (0.7-2.1)   06/28/18  18:52    


 


Calcium  9.6 mg/dl (8.6-10.4)   07/05/18  07:05    


 


Phosphorus  9.3 mg/dL (2.5-4.5)  H  07/05/18  07:05    


 


Magnesium  2.6 mg/dL (1.6-2.3)  H  07/05/18  07:05    


 


Ferritin  719.0 ng/mL  07/05/18  07:05    


 


Total Bilirubin  0.6 mg/dL (0.2-1.3)   07/05/18  07:05    


 


AST  13 U/L (17-59)  L  07/05/18  07:05    


 


ALT  28 U/L (21-72)   07/05/18  07:05    


 


Alkaline Phosphatase  30 U/L ()  L  07/05/18  07:05    


 


Total Creatine Kinase  67 U/L ()   06/28/18  13:12    


 


Troponin I  0.0130 ng/mL (0.00-0.120)   06/28/18  13:12    


 


Total Protein  6.2 g/dL (6.3-8.3)  L  07/05/18  07:05    


 


Albumin  3.7 g/dL (3.5-5.0)   07/05/18  07:05    


 


Globulin  2.5 gm/dL (2.2-3.9)   07/05/18  07:05    


 


Albumin/Globulin Ratio  1.5  (1.0-2.1)   07/05/18  07:05    


 


A1AT Clinical Indicatr  Not given   06/28/18  18:50    


 


A1AT Referred By  Not given   06/28/18  18:50    


 


Alpha-1-Antitrypsin  86 mg/dL ()   06/28/18  18:50    


 


Alpha-1-AT Phenotype  See note   06/28/18  18:50    


 


A1AT Mutation  TNP   06/28/18  18:50    


 


UF Heparin Interp  Negative  (Negative)   07/01/18  12:02    


 


Vitamin B12  819 pg/mL (239-931)   07/05/18  07:05    


 


Folate  > 20.0 ng/mL  07/05/18  07:05    


 


MICHAEL Nuclear Membr Pat  Positive  (Negative)  H  06/28/18  18:50    


 


Heparin-induced Plt Ab  Negative  (Negative)   07/01/18  12:02    


 


CRISTINA UFH Low Dose 0.1  0 % Release  07/01/18  12:02    


 


CRISTINA UFH Low Dose 0.5  0 % Release  07/01/18  12:02    


 


CRISTINA UFH High Dose 100  0 % Release  07/01/18  12:02    


 


Hepatitis A IgM Ab  Negative  (NEGATIVE)   07/03/18  21:17    


 


Hep Bs Antigen  Negative  (NEGATIVE)   07/03/18  21:17    


 


Hep Bs Antibody  Positive  (NEGATIVE)   07/03/18  21:17    


 


Hep B Core IgM Ab  Negative  (NEGATIVE)   07/03/18  21:17    


 


Hepatitis C Antibody  Negative  (NEGATIVE)   07/03/18  21:17    


 


HIV 1&2 Antibody Screen  Negative  (NEGATIVE)   07/03/18  21:16    














- Hospital Course


Hospital Course: 


This is a 34 yo male, originally from [place], with past medical hx of CHF 

systolic/diastolic dysfunction, cardiomyopathy, pulmonary hypertension, renal 

cysts, and ESRD on HD, presenting today to [facility] ER with chief complaint of 

right-sided chest pain and shortness of breath. He stated he woke up 4 days ago 

with right-sided chest pain which he thought would go away on it's own. However 

the pain worsened, he also had dyspnea which he stated prompted him to take 

deep breaths. He admitted to increased shortness of breath with ambulation. He 

denies trauma, palpitations, abdominal pain, urinary sx, diarrhea, constipation

, fevers, cough. He states he's complaint with his medications and has not 

missed a HD session.





During hospital course, pt had chest tube inserted for trace right 

pneumothorax. Pt had chest tube inserted for resolution. During stay patient 

also had dialysis to treat his ESRD. During his stay, pt had a femoral shiley 

temporally inserted due to issues of his AV fistula. Following further workup, 

there was a high grade stenosis proximal to his AV fistula, however, after 

additional trial, the AV fistula is patent for use. Pt also had a 

thrombocytopenia workup by Heme/ Onc for platlet count of of 80s. Pt tests were 

negative for HIT & Serotoin assay and had normal values of B12  & Folate. 

Additionally, the pt had negative hepaitits workup.





This is only a brief summary of patient stay at [facility]. For a full 

summary, please see patient records. 





Patient stable for discharge per Dr. Mendes. Patient should not resume all home 

medications (Lisinopril 5mg qd). Patient should additionally take the 

medications listed below as prescribed . Patient should make an appointment and 

follow up with PMD within one week for follow up care. Patient should also 

follow up for a nephrology specialist for management of his dialysis. Patient 

should also follow up with cardiothoracic surgeon Dr. Roman in 1 to 2 weeks. 

Patient can shower starting tomorrow 7/6/18, but he should avoid soaking/baths/ 

pools. Patient should resume his dialysis Monday, Wednesday, Friday schedule.  

Patient should return to ED immediately if symptoms return or worsen.  

Instructions discussed with patient who understood and agreed.


   


Newly prescribed medications:


Phoslo 1334 mg 3 times a day after meals





Discharge Exam





- Head Exam


Head Exam: ATRAUMATIC, NORMAL INSPECTION





- Eye Exam


Eye Exam: EOMI, Normal appearance





- ENT Exam


ENT Exam: Mucous Membranes Moist





- Neck Exam


Neck exam: Full Rom





- Respiratory Exam


Respiratory Exam: NORMAL BREATHING PATTERN.  absent: Rales, Rhonchi, Wheezes, 

Respiratory Distress





- Cardiovascular Exam


Cardiovascular Exam: +S1, +S2, Systolic Murmur





- GI/Abdominal Exam


GI & Abdominal Exam: Normal Bowel Sounds, Soft





- Extremities Exam


Extremities exam: full ROM, normal inspection, pedal pulses present


Additional comments: 





LUE has AV fistula w/ palpable thrill


RLE site of femoral shiley clean, dry with dressing intact


Chest tube site is clean dry, and dressing intact.





- Psychiatric Exam


Psychiatric exam: Normal Affect, Normal Mood





- Skin


Skin Exam: Dry, Normal Color





Discharge Plan





- Discharge Medications


Prescriptions: 


Calcium Acetate [Phoslo] 1,334 mg PO ACTID #90 tab





- Follow Up Plan


Condition: FAIR


Disposition: HOME/ ROUTINE


Instructions:  Pneumothorax (Collapsed Lung) (DC), Renal Failure Diet (DC)


Additional Instructions: 


Patient stable for discharge per Dr. Mendes. Patient should not resume all home 

medications (Lisinopril 5mg qd). Patient should additionally take the 

medications listed below as prescribed . Patient should make an appointment and 

follow up with PMD within one week for follow up care. Patient should also 

follow up for a nephrology specialist for management of his dialysis. Patient 

should also follow up with cardiothoracic surgeon Dr. Roman in 1 to 2 weeks. 

Patient can shower starting tomorrow 7/6/18, but he should avoid soaking/baths/ 

pools. Patient should resume his dialysis Monday, Wednesday, Friday schedule.  

Patient should return to ED immediately if symptoms return or worsen.  

Instructions discussed with patient who understood and agreed.


   


Newly prescribed medications:


Phoslo 1334 mg 3 times a day after meals





Paciente estable para el basil por Dr. Mendes. El paciente no debe reanudar 

todos los medicamentos caseros (Lisinopril 5mg qd). El paciente tambin debe 

den los medicamentos enumerados a continuacin segn lo prescrito. El 

paciente debe programar orquidea cabrera y realizar un seguimiento con PMD dentro de 

orquidea semana para la atencin de seguimiento. El paciente sue debe hacer el 

seguimiento de un especialista en nefrologa para el manejo de florian dilisis. El 

paciente tambin debe realizar un seguimiento con el cirujano cardiotorcico 

Dr. Roman en 1 a 2 semanas. El paciente puede ducharse a partir del da 6/6/18, 

connie debe evitar las inmersiones / baos / piscinas. El paciente debe reanudar 

florian programa de dilisis el lunes, mircoles y viernes. El paciente debe 

regresar a la johanna de urgencias inmediatamente si los sntomas reaparecen o 

empeoran. Instrucciones discutidas con el paciente que entendi y estuvo de 

acuerdo.





Medicamentos recetados recientemente:


Phoslo 1334 mg 3 veces al da despus de las comidas


Referrals: 


Emmanuel Roman MD [Staff Provider] - 





Clinical Quality Measures





- CQM - Heart Failure


Ejection Fraction: 40 % or Greater


Left Ventricular Function to be assessed after discharge: No


ACE Inhibitor Prescribed: No


Contraindication/Reason for not providing: Not clinically indicated.


Beta-Blocker Prescribed: Carvedilol


Angiotensin II Receptor Blocker Prescribed: No


Contraindication/Reason for not providing: Not clinically indicated.


AnticoagulationTherapy for Atrial Fibrillation/Atrialflutter: No


Contraindication/Reason for not providing: Not clinically indicated.


Aldosterone Antagonist Prescribed: No


Contraindication/Reason for not providing: Not clinically indicated.


Hydralazine Nitrate Prescribed: No


Contraindication/Reason for not providing: Not clinically indicated.


Implantable Cardioverter Defibrillator Therapy: No


Contraindication/Reason for not providing: Not clinically indicated.


Cardiac Resynchronization Therapy Prescribed: No


Contraindication/Reason for not providing: Not clinically indicated.


Will be discharged to: Home


Follow Up Date (must be within 7 days from discharge): 07/11/18


Follow Up Time: 09:00

## 2018-07-05 NOTE — CP.PCM.PN
Objective





- Vital Signs/Intake and Output


Vital Signs (last 24 hours): 


 











Temp Pulse Resp BP Pulse Ox


 


 98.4 F   64   20   142/86   99 


 


 07/05/18 00:00  07/05/18 00:00  07/05/18 00:00  07/05/18 00:00  07/05/18 00:00








Intake and Output: 


 











 07/04/18 07/05/18





 18:59 06:59


 


Intake Total 700 480


 


Output Total 95 


 


Balance 605 480














- Medications


Medications: 


 Current Medications





Aspirin (Aspirin Chewable)  81 mg PO DAILY [facility]


   Last Admin: 07/04/18 09:25 Dose:  81 mg


Calcitriol (Rocaltrol)  0.25 mcg PO DAILY [facility]


   Last Admin: 07/04/18 09:25 Dose:  0.25 mcg


Calcium Acetate (Phoslo)  1,334 mg PO ACTID [facility]


   Last Admin: 07/04/18 15:20 Dose:  1,334 mg


Cinacalcet (Sensipar)  30 mg PO DAILY [facility]


   Last Admin: 07/04/18 09:25 Dose:  30 mg


Pantoprazole Sodium (Protonix Ec Tab)  40 mg PO DAILY [facility]


   Last Admin: 07/04/18 09:25 Dose:  40 mg


Rosuvastatin Calcium (Crestor)  10 mg PO HS [facility]


   Last Admin: 07/04/18 22:04 Dose:  10 mg


Tramadol HCl (Ultram)  50 mg PO TID PRN


   PRN Reason: Pain, moderate (4-7)


   Last Admin: 07/03/18 00:02 Dose:  50 mg


Vitamin B Complex/Vit C/Folic Acid (Nephro-Gisselle)  1 tab PO DAILY [facility]


   Last Admin: 07/04/18 10:07 Dose:  1 tab











- Labs


Labs: 


 





 07/04/18 05:59 





 07/04/18 06:00 





 











PT  11.4 SECONDS (9.7-12.2)   07/01/18  17:12    


 


INR  1.0   07/01/18  17:12

## 2018-07-05 NOTE — CARD
--------------- APPROVED REPORT --------------





EKG Measurement

Heart [id number]

OH [id number]

[id number]

[id number]

[id number]



<Conclusion>

Normal sinus rhythm

Normal ECG

## 2024-08-12 NOTE — CARD
--------------- APPROVED REPORT --------------





EKG Measurement

Heart [id number]

MA 148P

[id number]

[id number]

[id number]



<Conclusion>

Sinus bradycardia

ST & T wave abnormality, consider anterior ischemia

Abnormal ECG no